# Patient Record
Sex: MALE | Race: WHITE | NOT HISPANIC OR LATINO | URBAN - METROPOLITAN AREA
[De-identification: names, ages, dates, MRNs, and addresses within clinical notes are randomized per-mention and may not be internally consistent; named-entity substitution may affect disease eponyms.]

---

## 2019-05-21 ENCOUNTER — INPATIENT HOSPITAL (OUTPATIENT)
Dept: URBAN - METROPOLITAN AREA HOSPITAL 133 | Facility: HOSPITAL | Age: 64
End: 2019-05-21

## 2019-05-21 DIAGNOSIS — K50.90 CROHN'S DISEASE, UNSPECIFIED, WITHOUT COMPLICATIONS: ICD-10-CM

## 2019-05-21 DIAGNOSIS — R10.9 UNSPECIFIED ABDOMINAL PAIN: ICD-10-CM

## 2019-05-21 DIAGNOSIS — Z94.0 KIDNEY TRANSPLANT STATUS: ICD-10-CM

## 2019-05-21 PROCEDURE — 99232 SBSQ HOSP IP/OBS MODERATE 35: CPT

## 2021-03-05 ENCOUNTER — OFFICE VISIT (OUTPATIENT)
Dept: CARDIOLOGY | Facility: CLINIC | Age: 66
End: 2021-03-05

## 2021-03-05 VITALS
HEART RATE: 61 BPM | WEIGHT: 212 LBS | SYSTOLIC BLOOD PRESSURE: 142 MMHG | HEIGHT: 69 IN | DIASTOLIC BLOOD PRESSURE: 82 MMHG | BODY MASS INDEX: 31.4 KG/M2

## 2021-03-05 DIAGNOSIS — I07.1 MODERATE TRICUSPID INSUFFICIENCY: Chronic | ICD-10-CM

## 2021-03-05 DIAGNOSIS — Z99.89 OBSTRUCTIVE SLEEP APNEA ON CPAP: Chronic | ICD-10-CM

## 2021-03-05 DIAGNOSIS — Z94.0 RENAL TRANSPLANT RECIPIENT: Chronic | ICD-10-CM

## 2021-03-05 DIAGNOSIS — I25.10 CORONARY ARTERY DISEASE INVOLVING NATIVE CORONARY ARTERY OF NATIVE HEART WITHOUT ANGINA PECTORIS: Primary | Chronic | ICD-10-CM

## 2021-03-05 DIAGNOSIS — I10 ESSENTIAL HYPERTENSION: Chronic | ICD-10-CM

## 2021-03-05 DIAGNOSIS — Z95.1 HISTORY OF CORONARY ARTERY BYPASS GRAFT: Chronic | ICD-10-CM

## 2021-03-05 DIAGNOSIS — I35.1 TRACE AORTIC VALVE REGURGITATION: Chronic | ICD-10-CM

## 2021-03-05 DIAGNOSIS — G47.33 OBSTRUCTIVE SLEEP APNEA ON CPAP: Chronic | ICD-10-CM

## 2021-03-05 DIAGNOSIS — N18.4 CKD (CHRONIC KIDNEY DISEASE) STAGE 4, GFR 15-29 ML/MIN (HCC): ICD-10-CM

## 2021-03-05 DIAGNOSIS — Z79.4 TYPE 2 DIABETES MELLITUS WITH OTHER CIRCULATORY COMPLICATION, WITH LONG-TERM CURRENT USE OF INSULIN (HCC): Chronic | ICD-10-CM

## 2021-03-05 DIAGNOSIS — E11.59 TYPE 2 DIABETES MELLITUS WITH OTHER CIRCULATORY COMPLICATION, WITH LONG-TERM CURRENT USE OF INSULIN (HCC): Chronic | ICD-10-CM

## 2021-03-05 DIAGNOSIS — E78.5 HYPERLIPIDEMIA, UNSPECIFIED HYPERLIPIDEMIA TYPE: Chronic | ICD-10-CM

## 2021-03-05 DIAGNOSIS — R06.89 RESPIRATORY INSUFFICIENCY: Chronic | ICD-10-CM

## 2021-03-05 PROCEDURE — 99215 OFFICE O/P EST HI 40 MIN: CPT | Performed by: INTERNAL MEDICINE

## 2021-03-05 RX ORDER — FERROUS SULFATE 220 (44)/5
5 ELIXIR ORAL DAILY
COMMUNITY

## 2021-03-05 RX ORDER — ALLOPURINOL 100 MG/1
100 TABLET ORAL 2 TIMES DAILY
COMMUNITY

## 2021-03-05 RX ORDER — LEVOTHYROXINE SODIUM 0.05 MG/1
50 TABLET ORAL DAILY
COMMUNITY

## 2021-03-05 RX ORDER — AMLODIPINE BESYLATE 2.5 MG/1
2.5 TABLET ORAL 2 TIMES DAILY
Qty: 90 TABLET | Refills: 3 | Status: SHIPPED | COMMUNITY
Start: 2021-03-05 | End: 2022-01-13 | Stop reason: HOSPADM

## 2021-03-05 RX ORDER — CALCITRIOL 0.25 UG/1
0.25 CAPSULE, LIQUID FILLED ORAL SEE ADMIN INSTRUCTIONS
COMMUNITY

## 2021-03-05 RX ORDER — ROSUVASTATIN CALCIUM 20 MG/1
20 TABLET, COATED ORAL DAILY
COMMUNITY

## 2021-03-05 RX ORDER — HYDRALAZINE HYDROCHLORIDE 100 MG/1
100 TABLET, FILM COATED ORAL 3 TIMES DAILY
COMMUNITY
End: 2022-01-13 | Stop reason: HOSPADM

## 2021-03-05 RX ORDER — FUROSEMIDE 80 MG
80 TABLET ORAL 2 TIMES DAILY
COMMUNITY

## 2021-03-05 NOTE — PROGRESS NOTES
"Chief Complaint  Establish Care    Subjective    History of Present Illness      I saw Dilshad Cee today for continued cardiovascular care.  He is a pleasant 65-year-old male who continues to observe the CDC guidelines.  Mr. Cee has stage IV chronic kidney disease.  He is to undergo evaluation for kidney transplantation.  He has known coronary artery disease and is status post coronary artery bypass surgery 12/15/2011 with a LIMA to the LAD, saphenous vein graft to D1 into the right coronary artery.  Procedure was complicated by chylothorax which subsequently resolved.  Dilshad is free of chest pain pressure or tightness.  His primary complaint is that of generalized fatigue and increased dyspnea on exertion.  He sleeps on one pillow free of orthopnea or PND.  He has minimal lower extremity edema.  He does have obstructive sleep apnea and uses CPAP routinely.  He denies syncope near syncope or palpitations.  Review of echocardiogram from 3/14/2017 reveals normal left ventricular contractility, mildly increased left atrium, trace aortic insufficiency, mild tricuspid insufficiency and right ventricular systolic pressure 38 mmHg.  He has a history of hypertension which is elevated in the office today at 142/82.  His last noninvasive ischemic assessment reviewed from 3/14/2017 revealed left ventricular ejection fraction 58% no evidence of ischemia, fixed inferior wall defect suggestive of attenuation.He has a history of hyperlipidemia and is on rosuvastatin 20 mg daily.Fasting lipid profile from 2-21 reveals triglycerides 264, HDL 48 , liver function test within normal limits, BUN 57 and creatinine 4.1, GFR 15.6.    Mr. Yung has type 2 diabetes managed by diet and insulin.  11    Objective   Vital Signs:   /82   Pulse 61   Ht 175.3 cm (69\")   Wt 96.2 kg (212 lb)   BMI 31.31 kg/m²     Constitutional:       Appearance: Well-developed.   Eyes:      Conjunctiva/sclera: Conjunctivae normal.      " Pupils: Pupils are equal, round, and reactive to light.   HENT:      Head: Normocephalic and atraumatic.   Neck:      Musculoskeletal: Neck supple.      Thyroid: No thyromegaly.   Pulmonary:      Effort: Pulmonary effort is normal. No respiratory distress.      Breath sounds: Normal breath sounds. No wheezing. No rales.   Cardiovascular:      Normal rate. Regular rhythm.      Murmurs: There is a grade 2/6 holosystolic murmur at the LRSB.      S4 Gallop. No S3 gallop. Midsystolic click click.   Edema:     Peripheral edema present.     Pretibial: bilateral trace edema of the pretibial area.  Abdominal:      General: Bowel sounds are normal. There is no distension.      Palpations: Abdomen is soft. There is no abdominal mass.      Tenderness: There is no abdominal tenderness.   Musculoskeletal: Normal range of motion.   Skin:     General: Skin is warm and dry.      Findings: No erythema.   Neurological:      Mental Status: Alert and oriented to person, place, and time.         Result Review :   The following data was reviewed by: Evgeny Sunshine MD on 03/05/2021: Lab from 2-21      Data reviewed: Cardiology studies Echocardiogram 3/14/2017, nuclear stress test 3/14/2017          Assessment and Plan    1. Respiratory insufficiency  Progressive  - Stress Test With Myocardial Perfusion; Future  - Adult Transthoracic Echo Complete W/ Color, Spectral and Contrast if Necessary Per Protocol; Future    2. Coronary artery disease involving native coronary artery of native heart without angina pectoris  Free of angina dyspnea on exertion may represent anginal equivalent  - Stress Test With Myocardial Perfusion; Future  - Adult Transthoracic Echo Complete W/ Color, Spectral and Contrast if Necessary Per Protocol; Future    3. History of coronary artery bypass graft      4. Trace aortic valve regurgitation  Compensated    5. Moderate tricuspid insufficiency  Compensated    6. Essential hypertension  Target less than 130/80  -  Stress Test With Myocardial Perfusion; Future  - Adult Transthoracic Echo Complete W/ Color, Spectral and Contrast if Necessary Per Protocol; Future    7. Hyperlipidemia, unspecified hyperlipidemia type  Low-cholesterol low saturated fat diet and continue rosuvastatin 20 mg daily    8. Type 2 diabetes mellitus with other circulatory complication, with long-term current use of insulin (CMS/[facility])  Weight reduction continue diet and insulin control    9. Obstructive sleep apnea on CPAP  Weight reduction continue CPAP    10. Renal transplant recipient  Reevaluation for redo transplant    11. CKD (chronic kidney disease) stage 4, GFR 15-29 ml/min (CMS/[facility])  Progressive    Mr. Chambers reports progressive respiratory insufficiency and demonstrates mild volume excess on physical examination.  I will obtain an echocardiogram to reassess left ventricular size wall thickness contractility and diastolic function as well as valvular function.  This may represent anginal equivalent we will therefore obtain a noninvasive ischemic assessment with stress nuclear testing.  I will advance amlodipine to 5 mg daily for further blood pressure control.  He will monitor his blood pressure at home if it exceeds 130/80 he will contact me.  I will plan to see him in follow-up in 3 months sooner if needed.  I recommended a target triglyceride level of less than 150 and LDL less than 70.      I spent 50 minutes caring for Dilshad on this date of service. This time includes time spent by me in the following activities:preparing for the visit, reviewing tests, obtaining and/or reviewing a separately obtained history, performing a medically appropriate examination and/or evaluation , counseling and educating the patient/family/caregiver, ordering medications, tests, or procedures, referring and communicating with other health care professionals , documenting information in the medical record, independently interpreting results and communicating that  information with the patient/family/caregiver and care coordination  Follow Up   No follow-ups on file.  Patient was given instructions and counseling regarding his condition or for health maintenance advice. Please see specific information pulled into the AVS if appropriate.

## 2021-03-08 ENCOUNTER — TRANSCRIBE ORDERS (OUTPATIENT)
Dept: ADMINISTRATIVE | Facility: HOSPITAL | Age: 66
End: 2021-03-08

## 2021-03-08 DIAGNOSIS — Z01.818 OTHER SPECIFIED PRE-OPERATIVE EXAMINATION: Primary | ICD-10-CM

## 2021-03-19 ENCOUNTER — BULK ORDERING (OUTPATIENT)
Dept: CASE MANAGEMENT | Facility: OTHER | Age: 66
End: 2021-03-19

## 2021-03-19 DIAGNOSIS — Z23 IMMUNIZATION DUE: ICD-10-CM

## 2021-03-25 ENCOUNTER — HOSPITAL ENCOUNTER (OUTPATIENT)
Dept: CARDIOLOGY | Facility: HOSPITAL | Age: 66
Discharge: HOME | End: 2021-03-25
Admitting: INTERNAL MEDICINE

## 2021-03-25 VITALS
WEIGHT: 212 LBS | BODY MASS INDEX: 31.4 KG/M2 | DIASTOLIC BLOOD PRESSURE: 69 MMHG | HEIGHT: 69 IN | HEART RATE: 60 BPM | SYSTOLIC BLOOD PRESSURE: 123 MMHG

## 2021-03-25 DIAGNOSIS — I10 ESSENTIAL HYPERTENSION: ICD-10-CM

## 2021-03-25 DIAGNOSIS — I25.10 CORONARY ARTERY DISEASE INVOLVING NATIVE CORONARY ARTERY OF NATIVE HEART WITHOUT ANGINA PECTORIS: ICD-10-CM

## 2021-03-25 DIAGNOSIS — R06.89 RESPIRATORY INSUFFICIENCY: ICD-10-CM

## 2021-03-25 LAB
AORTIC DIMENSIONLESS INDEX: 0.7 (DI)
BH CV ECHO MEAS - AO MAX PG (FULL): 7.1 MMHG
BH CV ECHO MEAS - AO MAX PG: 12.5 MMHG
BH CV ECHO MEAS - AO MEAN PG (FULL): 4 MMHG
BH CV ECHO MEAS - AO MEAN PG: 7 MMHG
BH CV ECHO MEAS - AO V2 MAX: 177 CM/SEC
BH CV ECHO MEAS - AO V2 MEAN: 122 CM/SEC
BH CV ECHO MEAS - AO V2 VTI: 35.4 CM
BH CV ECHO MEAS - AVA(I,A): 2.6 CM^2
BH CV ECHO MEAS - AVA(I,D): 2.6 CM^2
BH CV ECHO MEAS - AVA(V,A): 2.5 CM^2
BH CV ECHO MEAS - AVA(V,D): 2.5 CM^2
BH CV ECHO MEAS - BSA(HAYCOCK): 2.2 M^2
BH CV ECHO MEAS - BSA: 2.1 M^2
BH CV ECHO MEAS - BZI_BMI: 31.3 KILOGRAMS/M^2
BH CV ECHO MEAS - BZI_METRIC_HEIGHT: 175.3 CM
BH CV ECHO MEAS - BZI_METRIC_WEIGHT: 96.2 KG
BH CV ECHO MEAS - CONTRAST EF 4CH: 54 CM2
BH CV ECHO MEAS - EDV(CUBED): 175.6 ML
BH CV ECHO MEAS - EDV(MOD-SP2): 127 ML
BH CV ECHO MEAS - EDV(MOD-SP4): 137 ML
BH CV ECHO MEAS - EDV(TEICH): 153.7 ML
BH CV ECHO MEAS - EF(CUBED): 75.6 %
BH CV ECHO MEAS - EF(MOD-BP): 54 %
BH CV ECHO MEAS - EF(MOD-SP2): 53.5 %
BH CV ECHO MEAS - EF(MOD-SP4): 55.5 %
BH CV ECHO MEAS - EF(TEICH): 66.9 %
BH CV ECHO MEAS - ESV(CUBED): 42.9 ML
BH CV ECHO MEAS - ESV(MOD-SP2): 59 ML
BH CV ECHO MEAS - ESV(MOD-SP4): 61 ML
BH CV ECHO MEAS - ESV(TEICH): 50.9 ML
BH CV ECHO MEAS - FS: 37.5 %
BH CV ECHO MEAS - IVS/LVPW: 1.1
BH CV ECHO MEAS - IVSD: 1.5 CM
BH CV ECHO MEAS - LAT PEAK E' VEL: 9.6 CM/SEC
BH CV ECHO MEAS - LV DIASTOLIC VOL/BSA (35-75): 64.7 ML/M^2
BH CV ECHO MEAS - LV MASS(C)D: 365.4 GRAMS
BH CV ECHO MEAS - LV MASS(C)DI: 172.6 GRAMS/M^2
BH CV ECHO MEAS - LV MAX PG: 5.5 MMHG
BH CV ECHO MEAS - LV MEAN PG: 3 MMHG
BH CV ECHO MEAS - LV SYSTOLIC VOL/BSA (12-30): 28.8 ML/M^2
BH CV ECHO MEAS - LV V1 MAX: 117 CM/SEC
BH CV ECHO MEAS - LV V1 MEAN: 80.4 CM/SEC
BH CV ECHO MEAS - LV V1 VTI: 24.6 CM
BH CV ECHO MEAS - LVIDD: 5.6 CM
BH CV ECHO MEAS - LVIDS: 3.5 CM
BH CV ECHO MEAS - LVLD AP2: 8.3 CM
BH CV ECHO MEAS - LVLD AP4: 7.9 CM
BH CV ECHO MEAS - LVLS AP2: 6.2 CM
BH CV ECHO MEAS - LVLS AP4: 6.7 CM
BH CV ECHO MEAS - LVOT AREA (M): 3.8 CM^2
BH CV ECHO MEAS - LVOT AREA: 3.8 CM^2
BH CV ECHO MEAS - LVOT DIAM: 2.2 CM
BH CV ECHO MEAS - LVPWD: 1.4 CM
BH CV ECHO MEAS - MED PEAK E' VEL: 7.6 CM/SEC
BH CV ECHO MEAS - MV A DUR: 0.09 SEC
BH CV ECHO MEAS - MV A MAX VEL: 96.7 CM/SEC
BH CV ECHO MEAS - MV DEC SLOPE: 301 CM/SEC^2
BH CV ECHO MEAS - MV DEC TIME: 230 SEC
BH CV ECHO MEAS - MV E MAX VEL: 118 CM/SEC
BH CV ECHO MEAS - MV E/A: 1.2
BH CV ECHO MEAS - MV MAX PG: 6.6 MMHG
BH CV ECHO MEAS - MV MEAN PG: 3 MMHG
BH CV ECHO MEAS - MV P1/2T MAX VEL: 127 CM/SEC
BH CV ECHO MEAS - MV P1/2T: 123.6 MSEC
BH CV ECHO MEAS - MV V2 MAX: 128 CM/SEC
BH CV ECHO MEAS - MV V2 MEAN: 75.2 CM/SEC
BH CV ECHO MEAS - MV V2 VTI: 46.1 CM
BH CV ECHO MEAS - MVA P1/2T LCG: 1.7 CM^2
BH CV ECHO MEAS - MVA(P1/2T): 1.8 CM^2
BH CV ECHO MEAS - MVA(VTI): 2 CM^2
BH CV ECHO MEAS - PA ACC TIME: 0.09 SEC
BH CV ECHO MEAS - PA MAX PG (FULL): 3.9 MMHG
BH CV ECHO MEAS - PA MAX PG: 5.1 MMHG
BH CV ECHO MEAS - PA PR(ACCEL): 38.5 MMHG
BH CV ECHO MEAS - PA V2 MAX: 113 CM/SEC
BH CV ECHO MEAS - PULM A REVS DUR: 0.16 SEC
BH CV ECHO MEAS - PULM A REVS VEL: 29.4 CM/SEC
BH CV ECHO MEAS - PULM DIAS VEL: 41.8 CM/SEC
BH CV ECHO MEAS - PULM S/D: 1.5
BH CV ECHO MEAS - PULM SYS VEL: 61.1 CM/SEC
BH CV ECHO MEAS - PVA(V,A): 1.7 CM^2
BH CV ECHO MEAS - PVA(V,D): 1.7 CM^2
BH CV ECHO MEAS - QP/QS: 0.41
BH CV ECHO MEAS - RAP SYSTOLE: 3 MMHG
BH CV ECHO MEAS - RV MAX PG: 1.2 MMHG
BH CV ECHO MEAS - RV MEAN PG: 1 MMHG
BH CV ECHO MEAS - RV V1 MAX: 54.8 CM/SEC
BH CV ECHO MEAS - RV V1 MEAN: 37.7 CM/SEC
BH CV ECHO MEAS - RV V1 VTI: 11.1 CM
BH CV ECHO MEAS - RVOT AREA: 3.5 CM^2
BH CV ECHO MEAS - RVOT DIAM: 2.1 CM
BH CV ECHO MEAS - RVSP: 34.1 MMHG
BH CV ECHO MEAS - SI(CUBED): 62.7 ML/M^2
BH CV ECHO MEAS - SI(LVOT): 44.2 ML/M^2
BH CV ECHO MEAS - SI(MOD-SP2): 32.1 ML/M^2
BH CV ECHO MEAS - SI(MOD-SP4): 35.9 ML/M^2
BH CV ECHO MEAS - SI(TEICH): 48.5 ML/M^2
BH CV ECHO MEAS - SV(CUBED): 132.7 ML
BH CV ECHO MEAS - SV(LVOT): 93.5 ML
BH CV ECHO MEAS - SV(MOD-SP2): 68 ML
BH CV ECHO MEAS - SV(MOD-SP4): 76 ML
BH CV ECHO MEAS - SV(RVOT): 38.4 ML
BH CV ECHO MEAS - SV(TEICH): 102.8 ML
BH CV ECHO MEAS - TAPSE (>1.6): 2.1 CM
BH CV ECHO MEAS - TR MAX VEL: 279 CM/SEC
BH CV ECHO MEASUREMENTS AVERAGE E/E' RATIO: 13.72
BH CV VAS BP RIGHT ARM: NORMAL MMHG
BH CV XLRA - RV BASE: 3.8 CM
BH CV XLRA - RV LENGTH: 8 CM
BH CV XLRA - RV MID: 2.8 CM
BH CV XLRA - TDI S': 11.7 CM/SEC
LEFT ATRIUM VOLUME INDEX: 41 ML/M2
MAXIMAL PREDICTED HEART RATE: 154 BPM
STRESS TARGET HR: 131 BPM

## 2021-03-25 PROCEDURE — 93306 TTE W/DOPPLER COMPLETE: CPT

## 2021-03-25 PROCEDURE — 25010000002 PERFLUTREN (DEFINITY) 8.476 MG IN SODIUM CHLORIDE (PF) 0.9 % 10 ML INJECTION: Performed by: INTERNAL MEDICINE

## 2021-03-25 PROCEDURE — 93306 TTE W/DOPPLER COMPLETE: CPT | Performed by: INTERNAL MEDICINE

## 2021-03-25 RX ADMIN — SODIUM CHLORIDE 2 ML: 9 INJECTION INTRAMUSCULAR; INTRAVENOUS; SUBCUTANEOUS at 14:38

## 2021-04-07 ENCOUNTER — LAB (OUTPATIENT)
Dept: LAB | Facility: HOSPITAL | Age: 66
End: 2021-04-07

## 2021-04-07 DIAGNOSIS — Z01.818 OTHER SPECIFIED PRE-OPERATIVE EXAMINATION: ICD-10-CM

## 2021-04-07 PROCEDURE — U0005 INFEC AGEN DETEC AMPLI PROBE: HCPCS

## 2021-04-07 PROCEDURE — C9803 HOPD COVID-19 SPEC COLLECT: HCPCS

## 2021-04-07 PROCEDURE — U0004 COV-19 TEST NON-CDC HGH THRU: HCPCS

## 2021-04-08 LAB — SARS-COV-2 RNA RESP QL NAA+PROBE: NOT DETECTED

## 2021-04-09 ENCOUNTER — HOSPITAL ENCOUNTER (OUTPATIENT)
Dept: NUCLEAR MEDICINE | Facility: HOSPITAL | Age: 66
Discharge: HOME | End: 2021-04-09

## 2021-04-09 DIAGNOSIS — I10 ESSENTIAL HYPERTENSION: ICD-10-CM

## 2021-04-09 DIAGNOSIS — I25.10 CORONARY ARTERY DISEASE INVOLVING NATIVE CORONARY ARTERY OF NATIVE HEART WITHOUT ANGINA PECTORIS: ICD-10-CM

## 2021-04-09 DIAGNOSIS — R06.89 RESPIRATORY INSUFFICIENCY: ICD-10-CM

## 2021-04-09 LAB
BH CV REST NUCLEAR ISOTOPE DOSE: 10.8 MCI
BH CV STRESS BP STAGE 1: NORMAL
BH CV STRESS BP STAGE 2: NORMAL
BH CV STRESS DURATION MIN STAGE 1: 3
BH CV STRESS DURATION MIN STAGE 2: 2
BH CV STRESS DURATION SEC STAGE 1: 0
BH CV STRESS DURATION SEC STAGE 2: 50
BH CV STRESS GRADE STAGE 1: 10
BH CV STRESS GRADE STAGE 2: 12
BH CV STRESS HR STAGE 1: 112
BH CV STRESS HR STAGE 2: 103
BH CV STRESS METS STAGE 1: 5
BH CV STRESS METS STAGE 2: 7.5
BH CV STRESS NUCLEAR ISOTOPE DOSE: 32 MCI
BH CV STRESS PROTOCOL 1: NORMAL
BH CV STRESS PROTOCOL 2 COMMENTS STAGE 1: NORMAL
BH CV STRESS PROTOCOL 2 DOSE REGADENOSON STAGE 1: 0.4
BH CV STRESS PROTOCOL 2 DURATION MIN STAGE 1: 0
BH CV STRESS PROTOCOL 2 DURATION SEC STAGE 1: 10
BH CV STRESS PROTOCOL 2 STAGE 1: 1
BH CV STRESS PROTOCOL 2: NORMAL
BH CV STRESS RECOVERY BP: NORMAL MMHG
BH CV STRESS RECOVERY HR: 81 BPM
BH CV STRESS SPEED STAGE 1: 1.7
BH CV STRESS SPEED STAGE 2: 2.5
BH CV STRESS STAGE 1: 1
BH CV STRESS STAGE 2: 2
MAXIMAL PREDICTED HEART RATE: 154 BPM
PERCENT MAX PREDICTED HR: 76.62 %
STRESS BASELINE BP: NORMAL MMHG
STRESS BASELINE HR: 68 BPM
STRESS PERCENT HR: 90 %
STRESS POST ESTIMATED WORKLOAD: 4.6 METS
STRESS POST EXERCISE DUR MIN: 5 MIN
STRESS POST EXERCISE DUR SEC: 50 SEC
STRESS POST PEAK BP: NORMAL MMHG
STRESS POST PEAK HR: 118 BPM
STRESS TARGET HR: 131 BPM

## 2021-04-09 PROCEDURE — 78452 HT MUSCLE IMAGE SPECT MULT: CPT

## 2021-04-09 PROCEDURE — 0 TECHNETIUM SESTAMIBI: Performed by: INTERNAL MEDICINE

## 2021-04-09 PROCEDURE — A9500 TC99M SESTAMIBI: HCPCS | Performed by: INTERNAL MEDICINE

## 2021-04-09 PROCEDURE — 93017 CV STRESS TEST TRACING ONLY: CPT

## 2021-04-09 PROCEDURE — 25010000002 REGADENOSON 0.4 MG/5ML SOLUTION: Performed by: INTERNAL MEDICINE

## 2021-04-09 PROCEDURE — 78452 HT MUSCLE IMAGE SPECT MULT: CPT | Performed by: INTERNAL MEDICINE

## 2021-04-09 PROCEDURE — 93018 CV STRESS TEST I&R ONLY: CPT | Performed by: INTERNAL MEDICINE

## 2021-04-09 RX ADMIN — TECHNETIUM TC 99M SESTAMIBI 1 DOSE: 1 INJECTION INTRAVENOUS at 07:00

## 2021-04-09 RX ADMIN — REGADENOSON 0.4 MG: 0.08 INJECTION, SOLUTION INTRAVENOUS at 08:30

## 2021-04-09 RX ADMIN — TECHNETIUM TC 99M SESTAMIBI 1 DOSE: 1 INJECTION INTRAVENOUS at 08:30

## 2021-04-12 ENCOUNTER — TELEPHONE (OUTPATIENT)
Dept: CARDIOLOGY | Facility: CLINIC | Age: 66
End: 2021-04-12

## 2021-04-12 DIAGNOSIS — R06.89 RESPIRATORY INSUFFICIENCY: ICD-10-CM

## 2021-04-12 DIAGNOSIS — I25.10 CORONARY ARTERY DISEASE INVOLVING NATIVE CORONARY ARTERY OF NATIVE HEART WITHOUT ANGINA PECTORIS: ICD-10-CM

## 2021-04-12 DIAGNOSIS — Z01.818 PRE-OP TESTING: Primary | ICD-10-CM

## 2021-04-12 DIAGNOSIS — R94.39 POSITIVE CARDIAC STRESS TEST: ICD-10-CM

## 2021-04-12 NOTE — TELEPHONE ENCOUNTER
CNA PT    PT needs cardiac CL for Fistula creation with DR Kimbrough ([facility]) on Friday 4/16/21.    General Anesthesia  [phone] ext [phone] [place]

## 2021-04-12 NOTE — TELEPHONE ENCOUNTER
I spoke with pt this morning about his stress results and he is agreeable to a cath.     He wanted to have the fistula placed first just in case he has stents placed because he won't be able to hold Antiplatelet for fistula anytime soon if he does have stents.    Thanks,  Nathalie

## 2021-04-13 PROBLEM — I25.10 CORONARY ARTERY DISEASE INVOLVING NATIVE CORONARY ARTERY OF NATIVE HEART WITHOUT ANGINA PECTORIS: Status: ACTIVE | Noted: 2021-04-13

## 2021-04-13 PROBLEM — R94.39 POSITIVE CARDIAC STRESS TEST: Status: ACTIVE | Noted: 2021-04-13

## 2021-04-13 NOTE — TELEPHONE ENCOUNTER
Spoke with Marilin and per Dr. Dominguez and anesthesia, pt's surgery will need to be delayed because of the abnormal testing until after he has a cath. If a stent is placed, Dr. Dominguez can proceed with the fistula while pt is on Plavix. I will communicate with Marilin once cath is complete. Nathalie

## 2021-04-15 ENCOUNTER — TRANSCRIBE ORDERS (OUTPATIENT)
Dept: LAB | Facility: HOSPITAL | Age: 66
End: 2021-04-15

## 2021-04-15 DIAGNOSIS — Z01.818 OTHER SPECIFIED PRE-OPERATIVE EXAMINATION: Primary | ICD-10-CM

## 2021-04-20 ENCOUNTER — LAB (OUTPATIENT)
Dept: LAB | Facility: HOSPITAL | Age: 66
End: 2021-04-20

## 2021-04-20 DIAGNOSIS — I25.10 CORONARY ARTERY DISEASE INVOLVING NATIVE CORONARY ARTERY OF NATIVE HEART WITHOUT ANGINA PECTORIS: ICD-10-CM

## 2021-04-20 DIAGNOSIS — Z01.818 PRE-OP TESTING: ICD-10-CM

## 2021-04-20 DIAGNOSIS — R94.39 POSITIVE CARDIAC STRESS TEST: ICD-10-CM

## 2021-04-20 LAB
ALBUMIN SERPL-MCNC: 3.8 G/DL (ref 3.5–5.2)
ALBUMIN/GLOB SERPL: 1.1 G/DL
ALP SERPL-CCNC: 102 U/L (ref 39–117)
ALT SERPL W P-5'-P-CCNC: 20 U/L (ref 1–41)
ANION GAP SERPL CALCULATED.3IONS-SCNC: 14.5 MMOL/L (ref 5–15)
AST SERPL-CCNC: 19 U/L (ref 1–40)
BASOPHILS # BLD AUTO: 0.03 10*3/MM3 (ref 0–0.2)
BASOPHILS NFR BLD AUTO: 0.5 % (ref 0–1.5)
BILIRUB SERPL-MCNC: 0.3 MG/DL (ref 0–1.2)
BUN SERPL-MCNC: 72 MG/DL (ref 8–23)
BUN/CREAT SERPL: 16.9 (ref 7–25)
CALCIUM SPEC-SCNC: 9.5 MG/DL (ref 8.6–10.5)
CHLORIDE SERPL-SCNC: 98 MMOL/L (ref 98–107)
CO2 SERPL-SCNC: 25.5 MMOL/L (ref 22–29)
CREAT SERPL-MCNC: 4.26 MG/DL (ref 0.76–1.27)
DEPRECATED RDW RBC AUTO: 42.4 FL (ref 37–54)
EOSINOPHIL # BLD AUTO: 0.1 10*3/MM3 (ref 0–0.4)
EOSINOPHIL NFR BLD AUTO: 1.5 % (ref 0.3–6.2)
ERYTHROCYTE [DISTWIDTH] IN BLOOD BY AUTOMATED COUNT: 13 % (ref 12.3–15.4)
GFR SERPL CREATININE-BSD FRML MDRD: 14 ML/MIN/1.73
GFR SERPL CREATININE-BSD FRML MDRD: ABNORMAL ML/MIN/{1.73_M2}
GLOBULIN UR ELPH-MCNC: 3.5 GM/DL
GLUCOSE SERPL-MCNC: 194 MG/DL (ref 65–99)
HCT VFR BLD AUTO: 29.7 % (ref 37.5–51)
HGB BLD-MCNC: 9.7 G/DL (ref 13–17.7)
IMM GRANULOCYTES # BLD AUTO: 0.04 10*3/MM3 (ref 0–0.05)
IMM GRANULOCYTES NFR BLD AUTO: 0.6 % (ref 0–0.5)
INR PPP: 1.07 (ref 0.9–1.1)
LYMPHOCYTES # BLD AUTO: 0.37 10*3/MM3 (ref 0.7–3.1)
LYMPHOCYTES NFR BLD AUTO: 5.6 % (ref 19.6–45.3)
MCH RBC QN AUTO: 29.2 PG (ref 26.6–33)
MCHC RBC AUTO-ENTMCNC: 32.7 G/DL (ref 31.5–35.7)
MCV RBC AUTO: 89.5 FL (ref 79–97)
MONOCYTES # BLD AUTO: 1.03 10*3/MM3 (ref 0.1–0.9)
MONOCYTES NFR BLD AUTO: 15.6 % (ref 5–12)
NEUTROPHILS NFR BLD AUTO: 5.03 10*3/MM3 (ref 1.7–7)
NEUTROPHILS NFR BLD AUTO: 76.2 % (ref 42.7–76)
NRBC BLD AUTO-RTO: 0 /100 WBC (ref 0–0.2)
PLATELET # BLD AUTO: 251 10*3/MM3 (ref 140–450)
PMV BLD AUTO: 9.7 FL (ref 6–12)
POTASSIUM SERPL-SCNC: 3.8 MMOL/L (ref 3.5–5.2)
PROT SERPL-MCNC: 7.3 G/DL (ref 6–8.5)
PROTHROMBIN TIME: 13.7 SECONDS (ref 11.7–14.2)
RBC # BLD AUTO: 3.32 10*6/MM3 (ref 4.14–5.8)
SODIUM SERPL-SCNC: 138 MMOL/L (ref 136–145)
WBC # BLD AUTO: 6.6 10*3/MM3 (ref 3.4–10.8)

## 2021-04-20 PROCEDURE — 85025 COMPLETE CBC W/AUTO DIFF WBC: CPT

## 2021-04-20 PROCEDURE — 36415 COLL VENOUS BLD VENIPUNCTURE: CPT

## 2021-04-20 PROCEDURE — 80053 COMPREHEN METABOLIC PANEL: CPT

## 2021-04-20 PROCEDURE — 85610 PROTHROMBIN TIME: CPT

## 2021-04-24 ENCOUNTER — LAB (OUTPATIENT)
Dept: LAB | Facility: HOSPITAL | Age: 66
End: 2021-04-24

## 2021-04-24 DIAGNOSIS — Z01.818 OTHER SPECIFIED PRE-OPERATIVE EXAMINATION: ICD-10-CM

## 2021-04-24 PROCEDURE — U0004 COV-19 TEST NON-CDC HGH THRU: HCPCS

## 2021-04-24 PROCEDURE — C9803 HOPD COVID-19 SPEC COLLECT: HCPCS

## 2021-04-24 PROCEDURE — U0005 INFEC AGEN DETEC AMPLI PROBE: HCPCS

## 2021-04-26 LAB — SARS-COV-2 RNA RESP QL NAA+PROBE: NOT DETECTED

## 2021-04-27 ENCOUNTER — HOSPITAL ENCOUNTER (OUTPATIENT)
Facility: HOSPITAL | Age: 66
Setting detail: HOSPITAL OUTPATIENT SURGERY
Discharge: HOME | End: 2021-04-27
Attending: INTERNAL MEDICINE | Admitting: INTERNAL MEDICINE

## 2021-04-27 VITALS
TEMPERATURE: 97.6 F | OXYGEN SATURATION: 97 % | SYSTOLIC BLOOD PRESSURE: 139 MMHG | BODY MASS INDEX: 31.1 KG/M2 | DIASTOLIC BLOOD PRESSURE: 89 MMHG | HEIGHT: 69 IN | RESPIRATION RATE: 16 BRPM | HEART RATE: 59 BPM | WEIGHT: 210 LBS

## 2021-04-27 DIAGNOSIS — R06.89 RESPIRATORY INSUFFICIENCY: ICD-10-CM

## 2021-04-27 DIAGNOSIS — I25.10 CORONARY ARTERY DISEASE INVOLVING NATIVE CORONARY ARTERY OF NATIVE HEART WITHOUT ANGINA PECTORIS: ICD-10-CM

## 2021-04-27 DIAGNOSIS — R94.39 POSITIVE CARDIAC STRESS TEST: ICD-10-CM

## 2021-04-27 LAB
GLUCOSE BLDC GLUCOMTR-MCNC: 116 MG/DL (ref 70–130)
GLUCOSE BLDC GLUCOMTR-MCNC: 143 MG/DL (ref 70–130)

## 2021-04-27 PROCEDURE — C1894 INTRO/SHEATH, NON-LASER: HCPCS | Performed by: INTERNAL MEDICINE

## 2021-04-27 PROCEDURE — C1769 GUIDE WIRE: HCPCS | Performed by: INTERNAL MEDICINE

## 2021-04-27 PROCEDURE — C1887 CATHETER, GUIDING: HCPCS | Performed by: INTERNAL MEDICINE

## 2021-04-27 PROCEDURE — 25010000002 FENTANYL CITRATE (PF) 100 MCG/2ML SOLUTION: Performed by: INTERNAL MEDICINE

## 2021-04-27 PROCEDURE — 99153 MOD SED SAME PHYS/QHP EA: CPT | Performed by: INTERNAL MEDICINE

## 2021-04-27 PROCEDURE — 82962 GLUCOSE BLOOD TEST: CPT

## 2021-04-27 PROCEDURE — 93459 L HRT ART/GRFT ANGIO: CPT | Performed by: INTERNAL MEDICINE

## 2021-04-27 PROCEDURE — C1760 CLOSURE DEV, VASC: HCPCS | Performed by: INTERNAL MEDICINE

## 2021-04-27 PROCEDURE — 85347 COAGULATION TIME ACTIVATED: CPT

## 2021-04-27 PROCEDURE — 25010000002 HEPARIN (PORCINE) PER 1000 UNITS: Performed by: INTERNAL MEDICINE

## 2021-04-27 PROCEDURE — 0 IOPAMIDOL PER 1 ML: Performed by: INTERNAL MEDICINE

## 2021-04-27 PROCEDURE — 25010000002 MIDAZOLAM PER 1 MG: Performed by: INTERNAL MEDICINE

## 2021-04-27 PROCEDURE — 99152 MOD SED SAME PHYS/QHP 5/>YRS: CPT | Performed by: INTERNAL MEDICINE

## 2021-04-27 RX ORDER — SODIUM CHLORIDE 0.9 % (FLUSH) 0.9 %
10 SYRINGE (ML) INJECTION AS NEEDED
Status: DISCONTINUED | OUTPATIENT
Start: 2021-04-27 | End: 2021-04-27 | Stop reason: HOSPADM

## 2021-04-27 RX ORDER — HEPARIN SODIUM 1000 [USP'U]/ML
INJECTION, SOLUTION INTRAVENOUS; SUBCUTANEOUS AS NEEDED
Status: DISCONTINUED | OUTPATIENT
Start: 2021-04-27 | End: 2021-04-27 | Stop reason: HOSPADM

## 2021-04-27 RX ORDER — SODIUM CHLORIDE 0.9 % (FLUSH) 0.9 %
3 SYRINGE (ML) INJECTION EVERY 12 HOURS SCHEDULED
Status: DISCONTINUED | OUTPATIENT
Start: 2021-04-27 | End: 2021-04-27 | Stop reason: HOSPADM

## 2021-04-27 RX ORDER — FENTANYL CITRATE 50 UG/ML
INJECTION, SOLUTION INTRAMUSCULAR; INTRAVENOUS AS NEEDED
Status: DISCONTINUED | OUTPATIENT
Start: 2021-04-27 | End: 2021-04-27 | Stop reason: HOSPADM

## 2021-04-27 RX ORDER — MIDAZOLAM HYDROCHLORIDE 1 MG/ML
INJECTION INTRAMUSCULAR; INTRAVENOUS AS NEEDED
Status: DISCONTINUED | OUTPATIENT
Start: 2021-04-27 | End: 2021-04-27 | Stop reason: HOSPADM

## 2021-04-27 RX ORDER — SODIUM CHLORIDE 9 MG/ML
75 INJECTION, SOLUTION INTRAVENOUS CONTINUOUS
Status: DISCONTINUED | OUTPATIENT
Start: 2021-04-27 | End: 2021-04-27 | Stop reason: HOSPADM

## 2021-04-27 RX ORDER — SODIUM CHLORIDE 9 MG/ML
100 INJECTION, SOLUTION INTRAVENOUS CONTINUOUS
Status: DISCONTINUED | OUTPATIENT
Start: 2021-04-27 | End: 2021-04-27 | Stop reason: HOSPADM

## 2021-04-27 RX ORDER — LIDOCAINE HYDROCHLORIDE 20 MG/ML
INJECTION, SOLUTION INFILTRATION; PERINEURAL AS NEEDED
Status: DISCONTINUED | OUTPATIENT
Start: 2021-04-27 | End: 2021-04-27 | Stop reason: HOSPADM

## 2021-04-27 RX ORDER — ACETAMINOPHEN 325 MG/1
650 TABLET ORAL EVERY 4 HOURS PRN
Status: DISCONTINUED | OUTPATIENT
Start: 2021-04-27 | End: 2021-04-27 | Stop reason: HOSPADM

## 2021-04-27 RX ADMIN — SODIUM CHLORIDE 75 ML/HR: 9 INJECTION, SOLUTION INTRAVENOUS at 10:10

## 2021-04-27 NOTE — H&P
"Chief Complaint  Establish Care        Subjective       History of Present Illness       I saw Dilshad Cee today for continued cardiovascular care.  He is a pleasant 65-year-old male who continues to observe the CDC guidelines.  Mr. Cee has stage IV chronic kidney disease.  He is to undergo evaluation for kidney transplantation.  He has known coronary artery disease and is status post coronary artery bypass surgery 12/15/2011 with a LIMA to the LAD, saphenous vein graft to D1 into the right coronary artery.  Procedure was complicated by chylothorax which subsequently resolved.  Dilshad is free of chest pain pressure or tightness.  His primary complaint is that of generalized fatigue and increased dyspnea on exertion.  He sleeps on one pillow free of orthopnea or PND.  He has minimal lower extremity edema.  He does have obstructive sleep apnea and uses CPAP routinely.  He denies syncope near syncope or palpitations.  Review of echocardiogram from 3/14/2017 reveals normal left ventricular contractility, mildly increased left atrium, trace aortic insufficiency, mild tricuspid insufficiency and right ventricular systolic pressure 38 mmHg.  He has a history of hypertension which is elevated in the office today at 142/82.  His last noninvasive ischemic assessment reviewed from 3/14/2017 revealed left ventricular ejection fraction 58% no evidence of ischemia, fixed inferior wall defect suggestive of attenuation.He has a history of hyperlipidemia and is on rosuvastatin 20 mg daily.Fasting lipid profile from 2-21 reveals triglycerides 264, HDL 48 , liver function test within normal limits, BUN 57 and creatinine 4.1, GFR 15.6.     Mr. Yung has type 2 diabetes managed by diet and insulin.  11           Objective      Vital Signs:   /82   Pulse 61   Ht 175.3 cm (69\")   Wt 96.2 kg (212 lb)   BMI 31.31 kg/m²     Constitutional:       Appearance: Well-developed.   Eyes:      Conjunctiva/sclera: Conjunctivae " normal.      Pupils: Pupils are equal, round, and reactive to light.   HENT:      Head: Normocephalic and atraumatic.   Neck:      Musculoskeletal: Neck supple.      Thyroid: No thyromegaly.   Pulmonary:      Effort: Pulmonary effort is normal. No respiratory distress.      Breath sounds: Normal breath sounds. No wheezing. No rales.   Cardiovascular:      Normal rate. Regular rhythm.      Murmurs: There is a grade 2/6 holosystolic murmur at the LRSB.      S4 Gallop. No S3 gallop. Midsystolic click click.   Edema:     Peripheral edema present.     Pretibial: bilateral trace edema of the pretibial area.  Abdominal:      General: Bowel sounds are normal. There is no distension.      Palpations: Abdomen is soft. There is no abdominal mass.      Tenderness: There is no abdominal tenderness.   Musculoskeletal: Normal range of motion.   Skin:     General: Skin is warm and dry.      Findings: No erythema.   Neurological:      Mental Status: Alert and oriented to person, place, and time.              Result Review    :   The following data was reviewed by: Evgeny Sunshine MD on 03/05/2021: Lab from 2-21    Common Labs:          Data reviewed: Cardiology studies Echocardiogram 3/14/2017, nuclear stress test 3/14/2017             Assessment      Assessment and Plan    1. Respiratory insufficiency  Progressive  - Stress Test With Myocardial Perfusion; Future  - Adult Transthoracic Echo Complete W/ Color, Spectral and Contrast if Necessary Per Protocol; Future     2. Coronary artery disease involving native coronary artery of native heart without angina pectoris  Free of angina dyspnea on exertion may represent anginal equivalent  - Stress Test With Myocardial Perfusion; Future  - Adult Transthoracic Echo Complete W/ Color, Spectral and Contrast if Necessary Per Protocol; Future     3. History of coronary artery bypass graft        4. Trace aortic valve regurgitation  Compensated     5. Moderate tricuspid  insufficiency  Compensated     6. Essential hypertension  Target less than 130/80  - Stress Test With Myocardial Perfusion; Future  - Adult Transthoracic Echo Complete W/ Color, Spectral and Contrast if Necessary Per Protocol; Future     7. Hyperlipidemia, unspecified hyperlipidemia type  Low-cholesterol low saturated fat diet and continue rosuvastatin 20 mg daily     8. Type 2 diabetes mellitus with other circulatory complication, with long-term current use of insulin (CMS/HCC)  Weight reduction continue diet and insulin control     9. Obstructive sleep apnea on CPAP  Weight reduction continue CPAP     10. Renal transplant recipient  Reevaluation for redo transplant     11. CKD (chronic kidney disease) stage 4, GFR 15-29 ml/min (CMS/HCC)  Progressive     Mr. Chambers reports progressive respiratory insufficiency and demonstrates mild volume excess on physical examination.  I will obtain an echocardiogram to reassess left ventricular size wall thickness contractility and diastolic function as well as valvular function.  This may represent anginal equivalent we will therefore obtain a noninvasive ischemic assessment with stress nuclear testing.  I will advance amlodipine to 5 mg daily for further blood pressure control.  He will monitor his blood pressure at home if it exceeds 130/80 he will contact me.  I will plan to see him in follow-up in 3 months sooner if needed.  I recommended a target triglyceride level of less than 150 and LDL less than 70.      Interim update:  Stress test showed large defect in the anterolateral inferolateral and inferior walls with mild improvement inferior wall and lateral walls.  With stress-induced left ventricular dilatation.  Therefore the plan is to perform cardiac catheterization to evaluate for culprits to the above, concern for three-vessel disease.

## 2021-04-27 NOTE — DISCHARGE INSTRUCTIONS
[facility]  [address] [place] [address]      Coronary Angiogram (Femoral Approach) After Care     Refer to this sheet in the next few weeks. These instructions provide you with information on caring for yourself after your procedure. Your health care provider may also give you more specific instructions. Your treatment has been planned according to current medical practices, but problems sometimes occur. Call your health care provider if you have any problems or questions after your procedure.      What to Expect After the Procedure:  After your procedure, it is typical to have the following sensations:  · Minor discomfort or tenderness and a small bump at the catheter insertion site. The bump should usually decrease in size and tenderness within 1 to 2 weeks.  · Any bruising will usually fade within 2 to 4 weeks.  Home Care Instructions:  · Do not apply powder or lotion to the site.  · Do not take baths, swim, or use a hot tub until your health care provider approves and the site is completely healed.  · Do not bend, squat, or lift anything over 20 lb (9 kg) or as directed by your health care provider. However, we recommend lifting nothing heavier than a gallon of milk.    · You may shower 24 hours after the procedure. Remove the bandage (dressing) and gently wash the site with plain soap and water. Gently pat the site dry. You may apply a band aid daily for 2 days if desired.    · Inspect the site at least twice daily.  · Increase your fluid intake for the next 2 days.    · Limit your activity for the first 48 hours. .    · Avoid strenuous activity for 1 week or as advised by your physician.    · Follow instructions about when you can drive or return to work as directed by your physician.    · Hold direct pressure over the site when you cough, sneeze, laugh or change positions.  Do this for the next 2 days.    · Do not operate machinery or power tools for 24 hours.  · A responsible adult  should be with you for the first 24 hours after you arrive home. Do not make any important legal decisions or sign legal papers for 24 hours.  Do not drink alcohol for 24 hours.  · Metformin or any medications containing Metformin should not be taken for 48 hours after your procedure.    Call Your Doctor If:  · You have drainage (other than a small amount of blood on the dressing).  · You have chills or a fever > 101.  · You have redness, warmth, swelling(larger than a walnut), or pain at the insertion site  · .You have heavy bleeding from the site. If this happens, hold pressure on the site and call 911.  · You develop chest pain or shortness of breath, feel faint, or pass out.  · You develop pain, discoloration, coldness, numbness, tingling, or severe bruising in the leg that held the catheter.  · You develop bleeding from any other place, such as the bowels.    Make Sure You:  · Understand these instructions.  · Will watch your condition.  · Will get help right away if you are not doing well or get worse.

## 2021-04-27 NOTE — PERIOPERATIVE NURSING NOTE
Wife at bedside and stated she not yet spoken with Dr. Montejo. RN called Dr. Montejo on  phone and he spoke with wife.

## 2021-04-28 LAB — ACT BLD: 224 SECONDS (ref 82–152)

## 2021-04-28 NOTE — TELEPHONE ENCOUNTER
Pt did not have stents placed. Is it okay for him to proceed with fistula placement from your standpoint? There was a  with collaterals from no stenting.   Thanks,  Nathalie

## 2021-11-30 ENCOUNTER — TELEPHONE (OUTPATIENT)
Dept: CARDIOLOGY | Facility: CLINIC | Age: 66
End: 2021-11-30

## 2022-01-10 ENCOUNTER — APPOINTMENT (OUTPATIENT)
Dept: GENERAL RADIOLOGY | Facility: HOSPITAL | Age: 67
End: 2022-01-10

## 2022-01-10 ENCOUNTER — HOSPITAL ENCOUNTER (INPATIENT)
Facility: HOSPITAL | Age: 67
LOS: 3 days | Discharge: HOME | End: 2022-01-13
Attending: EMERGENCY MEDICINE | Admitting: HOSPITALIST

## 2022-01-10 DIAGNOSIS — N18.6 ESRD (END STAGE RENAL DISEASE): Primary | ICD-10-CM

## 2022-01-10 DIAGNOSIS — E87.70 HYPERVOLEMIA, UNSPECIFIED HYPERVOLEMIA TYPE: ICD-10-CM

## 2022-01-10 LAB
ALBUMIN SERPL-MCNC: 4 G/DL (ref 3.5–5.2)
ALBUMIN/GLOB SERPL: 1.4 G/DL
ALP SERPL-CCNC: 110 U/L (ref 39–117)
ALT SERPL W P-5'-P-CCNC: 20 U/L (ref 1–41)
ANION GAP SERPL CALCULATED.3IONS-SCNC: 18.5 MMOL/L (ref 5–15)
AST SERPL-CCNC: 25 U/L (ref 1–40)
BACTERIA UR QL AUTO: ABNORMAL /HPF
BASOPHILS # BLD AUTO: 0.05 10*3/MM3 (ref 0–0.2)
BASOPHILS NFR BLD AUTO: 0.7 % (ref 0–1.5)
BILIRUB SERPL-MCNC: 0.3 MG/DL (ref 0–1.2)
BILIRUB UR QL STRIP: NEGATIVE
BUN SERPL-MCNC: 101 MG/DL (ref 8–23)
BUN/CREAT SERPL: 16.5 (ref 7–25)
CALCIUM SPEC-SCNC: 9.1 MG/DL (ref 8.6–10.5)
CHLORIDE SERPL-SCNC: 101 MMOL/L (ref 98–107)
CLARITY UR: CLEAR
CO2 SERPL-SCNC: 20.5 MMOL/L (ref 22–29)
COLOR UR: YELLOW
CREAT SERPL-MCNC: 6.13 MG/DL (ref 0.76–1.27)
DEPRECATED RDW RBC AUTO: 48.1 FL (ref 37–54)
EOSINOPHIL # BLD AUTO: 0.07 10*3/MM3 (ref 0–0.4)
EOSINOPHIL NFR BLD AUTO: 0.9 % (ref 0.3–6.2)
ERYTHROCYTE [DISTWIDTH] IN BLOOD BY AUTOMATED COUNT: 13.4 % (ref 12.3–15.4)
GFR SERPL CREATININE-BSD FRML MDRD: 9 ML/MIN/1.73
GFR SERPL CREATININE-BSD FRML MDRD: ABNORMAL ML/MIN/{1.73_M2}
GLOBULIN UR ELPH-MCNC: 2.8 GM/DL
GLUCOSE BLDC GLUCOMTR-MCNC: 137 MG/DL (ref 70–130)
GLUCOSE BLDC GLUCOMTR-MCNC: 94 MG/DL (ref 70–130)
GLUCOSE SERPL-MCNC: 157 MG/DL (ref 65–99)
GLUCOSE UR STRIP-MCNC: NEGATIVE MG/DL
GRAN CASTS URNS QL MICRO: ABNORMAL /LPF
HBV SURFACE AG SERPL QL IA: NORMAL
HCT VFR BLD AUTO: 33 % (ref 37.5–51)
HGB BLD-MCNC: 10.3 G/DL (ref 13–17.7)
HGB UR QL STRIP.AUTO: NEGATIVE
HYALINE CASTS UR QL AUTO: ABNORMAL /LPF
IMM GRANULOCYTES # BLD AUTO: 0.04 10*3/MM3 (ref 0–0.05)
IMM GRANULOCYTES NFR BLD AUTO: 0.5 % (ref 0–0.5)
KETONES UR QL STRIP: NEGATIVE
LEUKOCYTE ESTERASE UR QL STRIP.AUTO: NEGATIVE
LYMPHOCYTES # BLD AUTO: 0.79 10*3/MM3 (ref 0.7–3.1)
LYMPHOCYTES NFR BLD AUTO: 10.4 % (ref 19.6–45.3)
MCH RBC QN AUTO: 30.4 PG (ref 26.6–33)
MCHC RBC AUTO-ENTMCNC: 31.2 G/DL (ref 31.5–35.7)
MCV RBC AUTO: 97.3 FL (ref 79–97)
MONOCYTES # BLD AUTO: 1.02 10*3/MM3 (ref 0.1–0.9)
MONOCYTES NFR BLD AUTO: 13.5 % (ref 5–12)
NEUTROPHILS NFR BLD AUTO: 5.6 10*3/MM3 (ref 1.7–7)
NEUTROPHILS NFR BLD AUTO: 74 % (ref 42.7–76)
NITRITE UR QL STRIP: NEGATIVE
NRBC BLD AUTO-RTO: 0 /100 WBC (ref 0–0.2)
PH UR STRIP.AUTO: <=5 [PH] (ref 5–8)
PLATELET # BLD AUTO: 268 10*3/MM3 (ref 140–450)
PMV BLD AUTO: 10.6 FL (ref 6–12)
POTASSIUM SERPL-SCNC: 4.2 MMOL/L (ref 3.5–5.2)
PROT SERPL-MCNC: 6.8 G/DL (ref 6–8.5)
PROT UR QL STRIP: ABNORMAL
QT INTERVAL: 505 MS
RBC # BLD AUTO: 3.39 10*6/MM3 (ref 4.14–5.8)
RBC # UR STRIP: ABNORMAL /HPF
REF LAB TEST METHOD: ABNORMAL
SARS-COV-2 RNA RESP QL NAA+PROBE: NOT DETECTED
SODIUM SERPL-SCNC: 140 MMOL/L (ref 136–145)
SP GR UR STRIP: 1.01 (ref 1–1.03)
SQUAMOUS #/AREA URNS HPF: ABNORMAL /HPF
UROBILINOGEN UR QL STRIP: ABNORMAL
WBC # UR STRIP: ABNORMAL /HPF
WBC NRBC COR # BLD: 7.57 10*3/MM3 (ref 3.4–10.8)
YEAST URNS QL MICRO: ABNORMAL /HPF

## 2022-01-10 PROCEDURE — 80053 COMPREHEN METABOLIC PANEL: CPT | Performed by: EMERGENCY MEDICINE

## 2022-01-10 PROCEDURE — 81001 URINALYSIS AUTO W/SCOPE: CPT | Performed by: EMERGENCY MEDICINE

## 2022-01-10 PROCEDURE — 99221 1ST HOSP IP/OBS SF/LOW 40: CPT | Performed by: SURGERY

## 2022-01-10 PROCEDURE — U0005 INFEC AGEN DETEC AMPLI PROBE: HCPCS | Performed by: EMERGENCY MEDICINE

## 2022-01-10 PROCEDURE — 87340 HEPATITIS B SURFACE AG IA: CPT | Performed by: INTERNAL MEDICINE

## 2022-01-10 PROCEDURE — 93005 ELECTROCARDIOGRAM TRACING: CPT | Performed by: EMERGENCY MEDICINE

## 2022-01-10 PROCEDURE — 5A1D70Z PERFORMANCE OF URINARY FILTRATION, INTERMITTENT, LESS THAN 6 HOURS PER DAY: ICD-10-PCS | Performed by: INTERNAL MEDICINE

## 2022-01-10 PROCEDURE — 71045 X-RAY EXAM CHEST 1 VIEW: CPT

## 2022-01-10 PROCEDURE — 82962 GLUCOSE BLOOD TEST: CPT

## 2022-01-10 PROCEDURE — U0003 INFECTIOUS AGENT DETECTION BY NUCLEIC ACID (DNA OR RNA); SEVERE ACUTE RESPIRATORY SYNDROME CORONAVIRUS 2 (SARS-COV-2) (CORONAVIRUS DISEASE [COVID-19]), AMPLIFIED PROBE TECHNIQUE, MAKING USE OF HIGH THROUGHPUT TECHNOLOGIES AS DESCRIBED BY CMS-2020-01-R: HCPCS | Performed by: EMERGENCY MEDICINE

## 2022-01-10 PROCEDURE — 99284 EMERGENCY DEPT VISIT MOD MDM: CPT

## 2022-01-10 PROCEDURE — 36415 COLL VENOUS BLD VENIPUNCTURE: CPT | Performed by: INTERNAL MEDICINE

## 2022-01-10 PROCEDURE — 85025 COMPLETE CBC W/AUTO DIFF WBC: CPT | Performed by: EMERGENCY MEDICINE

## 2022-01-10 RX ORDER — HYDRALAZINE HYDROCHLORIDE 50 MG/1
100 TABLET, FILM COATED ORAL 3 TIMES DAILY
Status: DISCONTINUED | OUTPATIENT
Start: 2022-01-10 | End: 2022-01-11

## 2022-01-10 RX ORDER — ASPIRIN 81 MG/1
81 TABLET ORAL DAILY
Status: DISCONTINUED | OUTPATIENT
Start: 2022-01-10 | End: 2022-01-13 | Stop reason: HOSPADM

## 2022-01-10 RX ORDER — TACROLIMUS 1 MG/1
1 CAPSULE ORAL 2 TIMES DAILY
Status: DISCONTINUED | OUTPATIENT
Start: 2022-01-10 | End: 2022-01-13 | Stop reason: HOSPADM

## 2022-01-10 RX ORDER — FUROSEMIDE 80 MG
80 TABLET ORAL 2 TIMES DAILY
Status: DISCONTINUED | OUTPATIENT
Start: 2022-01-10 | End: 2022-01-13 | Stop reason: HOSPADM

## 2022-01-10 RX ORDER — INSULIN LISPRO 100 [IU]/ML
0-7 INJECTION, SOLUTION INTRAVENOUS; SUBCUTANEOUS
Status: DISCONTINUED | OUTPATIENT
Start: 2022-01-10 | End: 2022-01-13 | Stop reason: HOSPADM

## 2022-01-10 RX ORDER — CALCITRIOL 0.25 UG/1
0.25 CAPSULE, LIQUID FILLED ORAL
Status: DISCONTINUED | OUTPATIENT
Start: 2022-01-11 | End: 2022-01-13 | Stop reason: HOSPADM

## 2022-01-10 RX ORDER — INSULIN LISPRO 100 [IU]/ML
5 INJECTION, SOLUTION INTRAVENOUS; SUBCUTANEOUS
Status: DISCONTINUED | OUTPATIENT
Start: 2022-01-10 | End: 2022-01-13 | Stop reason: HOSPADM

## 2022-01-10 RX ORDER — BUMETANIDE 0.25 MG/ML
2 INJECTION INTRAMUSCULAR; INTRAVENOUS ONCE
Status: DISCONTINUED | OUTPATIENT
Start: 2022-01-10 | End: 2022-01-10

## 2022-01-10 RX ORDER — FERROUS SULFATE 325(65) MG
325 TABLET ORAL
Status: DISCONTINUED | OUTPATIENT
Start: 2022-01-10 | End: 2022-01-13 | Stop reason: HOSPADM

## 2022-01-10 RX ORDER — SODIUM CHLORIDE 0.9 % (FLUSH) 0.9 %
10 SYRINGE (ML) INJECTION AS NEEDED
Status: DISCONTINUED | OUTPATIENT
Start: 2022-01-10 | End: 2022-01-13 | Stop reason: HOSPADM

## 2022-01-10 RX ORDER — TACROLIMUS 1 MG/1
1 CAPSULE ORAL 2 TIMES DAILY
COMMUNITY

## 2022-01-10 RX ORDER — BUMETANIDE 0.25 MG/ML
2 INJECTION INTRAMUSCULAR; INTRAVENOUS ONCE
Status: COMPLETED | OUTPATIENT
Start: 2022-01-10 | End: 2022-01-10

## 2022-01-10 RX ORDER — ROSUVASTATIN CALCIUM 20 MG/1
20 TABLET, COATED ORAL DAILY
Status: DISCONTINUED | OUTPATIENT
Start: 2022-01-10 | End: 2022-01-11

## 2022-01-10 RX ORDER — ALLOPURINOL 100 MG/1
100 TABLET ORAL 2 TIMES DAILY
Status: DISCONTINUED | OUTPATIENT
Start: 2022-01-10 | End: 2022-01-13 | Stop reason: HOSPADM

## 2022-01-10 RX ORDER — PREDNISONE 10 MG/1
5 TABLET ORAL DAILY
Status: DISCONTINUED | OUTPATIENT
Start: 2022-01-10 | End: 2022-01-13 | Stop reason: HOSPADM

## 2022-01-10 RX ORDER — AMLODIPINE BESYLATE 5 MG/1
2.5 TABLET ORAL 2 TIMES DAILY
Status: DISCONTINUED | OUTPATIENT
Start: 2022-01-10 | End: 2022-01-10

## 2022-01-10 RX ORDER — AMOXICILLIN 250 MG
2 CAPSULE ORAL NIGHTLY
Status: DISCONTINUED | OUTPATIENT
Start: 2022-01-10 | End: 2022-01-13 | Stop reason: HOSPADM

## 2022-01-10 RX ORDER — CLOPIDOGREL BISULFATE 75 MG/1
75 TABLET ORAL DAILY
COMMUNITY
Start: 2021-08-19

## 2022-01-10 RX ORDER — CLONIDINE HYDROCHLORIDE 0.1 MG/1
0.1 TABLET ORAL 3 TIMES DAILY
Status: DISCONTINUED | OUTPATIENT
Start: 2022-01-10 | End: 2022-01-11

## 2022-01-10 RX ORDER — LIDOCAINE AND PRILOCAINE 25; 25 MG/G; MG/G
1 CREAM TOPICAL ONCE
Status: CANCELLED | OUTPATIENT
Start: 2022-01-10

## 2022-01-10 RX ORDER — FAMOTIDINE 20 MG/1
20 TABLET, FILM COATED ORAL 2 TIMES DAILY
Status: DISCONTINUED | OUTPATIENT
Start: 2022-01-10 | End: 2022-01-11

## 2022-01-10 RX ORDER — CLOPIDOGREL BISULFATE 75 MG/1
75 TABLET ORAL DAILY
Status: DISCONTINUED | OUTPATIENT
Start: 2022-01-10 | End: 2022-01-13 | Stop reason: HOSPADM

## 2022-01-10 RX ORDER — CALCITRIOL 0.25 UG/1
0.25 CAPSULE, LIQUID FILLED ORAL SEE ADMIN INSTRUCTIONS
Status: DISCONTINUED | OUTPATIENT
Start: 2022-01-10 | End: 2022-01-10

## 2022-01-10 RX ORDER — INSULIN LISPRO 100 [IU]/ML
6 INJECTION, SOLUTION INTRAVENOUS; SUBCUTANEOUS
Status: DISCONTINUED | OUTPATIENT
Start: 2022-01-10 | End: 2022-01-10

## 2022-01-10 RX ORDER — AMLODIPINE BESYLATE 5 MG/1
5 TABLET ORAL
Status: DISCONTINUED | OUTPATIENT
Start: 2022-01-10 | End: 2022-01-11

## 2022-01-10 RX ORDER — LEVOTHYROXINE SODIUM 0.05 MG/1
50 TABLET ORAL DAILY
Status: DISCONTINUED | OUTPATIENT
Start: 2022-01-10 | End: 2022-01-13 | Stop reason: HOSPADM

## 2022-01-10 RX ADMIN — SENNOSIDES AND DOCUSATE SODIUM 2 TABLET: 8.6; 5 TABLET ORAL at 23:00

## 2022-01-10 RX ADMIN — INSULIN GLARGINE-YFGN 15 UNITS: 100 INJECTION, SOLUTION SUBCUTANEOUS at 22:22

## 2022-01-10 RX ADMIN — METOPROLOL TARTRATE 25 MG: 25 TABLET, FILM COATED ORAL at 22:41

## 2022-01-10 RX ADMIN — BUMETANIDE 2 MG: 0.25 INJECTION INTRAMUSCULAR; INTRAVENOUS at 09:04

## 2022-01-10 RX ADMIN — FAMOTIDINE 20 MG: 20 TABLET, FILM COATED ORAL at 22:42

## 2022-01-10 NOTE — ED TRIAGE NOTES
To ER via PV.  C/o SOA and weakness on any exertion.  Pt states stage 4 kidney failure.  Pt had kidney transplant in 2014 that has failed.  Pt reports orthopnea as well.    Fistula placed 2 mos ago for impending need for dialysis.      Pt in mask at time of triage,  Triage staff in appropriate PPE.

## 2022-01-10 NOTE — CONSULTS
[facility] Consult Note      Patient Name: Dilshad Cee  : 1955  MRN: [id number]  Primary Care Physician:  Sam Marie MD  Referring Physician: Sohail Padron MD  Date of admission: 1/10/2022    Subjective     Reason for Consult: CKD 5 with uremic symptoms and failing kidney transplant    HPI:   Dilshad Cee is a 66 y.o. male the patient presented to the emergency department with increasing shortness of breath with orthopnea and PND for the past few days he had decreased appetite and increasing fatigue also he had metallic taste.  He has history of  donor kidney transplant in 2014 for IgA nephropathy he was preemptively transplanted before needing dialysis.  His renal function was gradually declining and he had an AV fistula created in his right upper arm in 2021 in preparation for dialysis.  Past medical history includes hypertension, gout, IgA nephropathy of the native kidneys, failing  donor kidney transplant, metabolic bone disease with secondary hyperparathyroidism, dyslipidemia and anemia of chronic kidney disease, diabetes mellitus type 2 diagnosed and treated since his transplant, he had coronary artery disease with prior three-vessel CABG in , he had bilateral carotid disease and he had recent arteriogram revealed less than 60% obstruction bilaterally    He has no nausea or vomiting, has decreased appetite and metallic taste, sleep disturbance, and generalized weakness also he has orthopnea and PND but no chest pain no palpitation.    Review of Systems:   14 point review of systems is otherwise negative except for mentioned above on HPI    Personal History     Past Medical History:   Diagnosis Date   • Anemia    • Chronic kidney disease    • Coronary artery disease    • Gout    • History of cardiac cath     2011 - Revealed right atrial mean of 7. RV 35/8. PA 33/15. PA mean 19. Wedge 14. Cardiac index 2.71.  Left main distal 20%. LAD proximal 90%, mid diaginal ostial 80%. Circumflex small but normal. RCA dominant proximal 60%.   • History of echocardiogram    • History of nuclear stress test     3/14/2017 Small fixed inferior wall defect, suggestive of attenuation. Mild septal hypokinesis. EF 58%. 10/11/2011 Reveals LVEF 59%, possible distal apical wall ischemia.   • Hyperlipidemia    • Hypertension    • Renal transplant recipient    • Respiratory insufficiency        Past Surgical History:   Procedure Laterality Date   • AMPUTATION      LEFT 3RD DIGIT   • CARDIAC CATHETERIZATION N/A 4/27/2021    Procedure: Left Heart Cath;  Surgeon: Denis Montejo MD;  Location: [facility] CATH INVASIVE LOCATION;  Service: Cardiology;  Laterality: N/A;   • CARDIAC CATHETERIZATION N/A 4/27/2021    Procedure: Coronary angiography;  Surgeon: Denis Montejo MD;  [facility]ATH [facility] LOC[facility];  Service: Cardiology;  Laterality: N/A;   • CARDIAC CATHETERIZATION  4/27/2021    Procedure: Saphenous Vein Graft;  Surgeon: Denis Montejo MD;  Location: [facility] CATH INVASIVE LOCATION;  Service: Cardiology;;   • CARPAL TUNNEL RELEASE Bilateral    • CORONARY ARTERY BYPASS GRAFT  12/15/2011    LIMA to LAD, SVG to OM1 and RCA. By Dr Ortiz   • KNEE ARTHROSCOPY W/ MENISCAL REPAIR Right    • TRANSPLANTATION RENAL  02/05/2014       Family History: family history includes Alcohol abuse in his brother; Cancer in his mother; Heart disease in his father; Hyperlipidemia in his brother; Hypertension in his father.    Social History:  reports that he has never smoked. He has never used smokeless tobacco. He reports that he does not use drugs.    Home Medications:  Prior to Admission medications    Medication Sig Start Date End Date Taking? Authorizing Provider   allopurinol (ZYLOPRIM) 100 MG tablet Take 100 mg by mouth 2 (Two) Times a Day.   Yes Provider, MD Tuyet   amLODIPine (NORVASC) 2.5 MG tablet Take 2.5 mg by  mouth 2 (Two) Times a Day. 3/5/21  Yes Evgeny Sunshine MD   aspirin 81 MG EC tablet Take 81 mg by mouth Daily.   Yes Tuyet Lincoln MD   calcitriol (ROCALTROL) 0.25 MCG capsule Take 0.25 mcg by mouth See Admin Instructions. Takes .25mg 2 times daily, every other day.   Yes Tuyet Lincoln MD   cloNIDine (CATAPRES) 0.1 MG tablet Take 0.1 mg by mouth 3 (Three) Times a Day.   Yes Tuyet Lincoln MD   clopidogrel (PLAVIX) 75 MG tablet Take 75 mg by mouth Daily. 8/19/21  Yes Tuyet Lincoln MD   everolimus (ZORTRESS) 0.75 MG tablet Take 0.75 mg by mouth 2 (Two) Times a Day.   Yes Tuyet Lincoln MD   ferrous sulfate (FeroSul) 220 (44 Fe) MG/5ML solution Take 5 mL by mouth Daily.   Yes Tuyet Lincoln MD   furosemide (LASIX) 80 MG tablet Take 80 mg by mouth 2 (Two) Times a Day.   Yes Tuyet Lincoln MD   hydrALAZINE (APRESOLINE) 100 MG tablet Take 100 mg by mouth 3 (Three) Times a Day.   Yes Tuyet Lincoln MD   insulin aspart (novoLOG) 100 UNIT/ML injection Inject 6 Units under the skin into the appropriate area as directed 3 (Three) Times a Day Before Meals.   Yes Provider, Historical, MD   insulin detemir (LEVEMIR) 100 UNIT/ML injection Inject 9 Units under the skin into the appropriate area as directed Every Night.   Yes Tuyet Lincoln MD   levothyroxine (SYNTHROID, LEVOTHROID) 50 MCG tablet Take 50 mcg by mouth Daily.   Yes Tuyet Lincoln MD   metoprolol tartrate (LOPRESSOR) 25 MG tablet Take 25 mg by mouth 2 (Two) Times a Day.   Yes Tuyet Lincoln MD   Multiple Vitamins-Minerals (MULTIVITAMIN ADULT PO) Take 1 tablet by mouth Daily.   Yes Provider, Historical, MD   predniSONE (DELTASONE) 5 MG tablet Take 5 mg by mouth Daily.   Yes Provider, Historical, MD   rosuvastatin (CRESTOR) 20 MG tablet Take 20 mg by mouth Daily.   Yes Tuyet Lincoln MD   tacrolimus (PROGRAF) 1 MG capsule Take 1 mg by mouth 2 (Two) Times a Day.   Yes Salazar  Historical, MD       Allergies:  Allergies   Allergen Reactions   • Cephalexin Unknown (See Comments) and Rash     .       Objective     Vitals:   Temp:  [96.5 °F (35.8 °C)] 96.5 °F (35.8 °C)  Heart Rate:  [67] 67  Resp:  [24] 24  BP: (145)/(85) 145/85  No intake or output data in the 24 hours ending 01/10/22 1026    Physical Exam:   Constitutional: Awake, alert, no acute distress.  Appears to be chronically ill  HEENT: Sclera anicteric, no conjunctival injection  Neck: Supple, no thyromegaly, no lymphadenopathy, trachea at midline, mild JVD  Respiratory: Scattered rhonchi, nonlabored respiration  Cardiovascular: RRR, no murmurs, no rubs or gallops, bilateral carotid bruit  Gastrointestinal: Positive bowel sounds, abdomen is soft, nontender and nondistended, he has a reducible umbilical hernia  : No palpable bladder  Musculoskeletal: He has an AV fistula in the right upper extremity with good thrill and bruit, he has 2+ lower extremity edema, no cyanosis or clubbing  Psychiatric: Appropriate affect, cooperative  Neurologic: Oriented x3, moving all extremities, normal speech and mental status  Skin: Warm and dry       Scheduled Meds:        IV Meds:        Results Reviewed:   I have personally reviewed the results from the time of this admission to 1/10/2022 10:26 EST     Lab Results   Component Value Date    GLUCOSE 157 (H) 01/10/2022    CALCIUM 9.1 01/10/2022     01/10/2022    K 4.2 01/10/2022    CO2 20.5 (L) 01/10/2022     01/10/2022     (H) 01/10/2022    CREATININE 6.13 (H) 01/10/2022    EGFRIFAFRI  01/10/2022      Comment:      <15 Indicative of kidney failure.    EGFRIFNONA 9 (L) 01/10/2022    BCR 16.5 01/10/2022    ANIONGAP 18.5 (H) 01/10/2022      Lab Results   Component Value Date    ALBUMIN 4.00 01/10/2022           Assessment / Plan     ASSESSMENT:  -CKD stage V secondary to failing kidney transplant with uremic symptoms  - donor kidney transplant in 2014 secondary to  IgA nephropathy allograft is failing  -Coronary artery disease prior CABG 2011 and he had symptoms of congestive heart failure  -Carotid disease with bilateral disease less than 60% being followed and observed by vascular surgery  -Anemia of chronic kidney disease  -Metabolic bone disease with secondary hyperparathyroidism secondary to renal failure  -Diabetes mellitus type 2 diagnosed and treated since his kidney transplant in 2014  -Umbilical hernia  -Hypervolemia    PLAN:  1. Will initiate hemodialysis today and challenge fluid removal  2.  I discussed with the patient home dialysis and he is interested I will ask Dr. Portillo to see him regarding PD catheter  3. Surveillance labs    I had long discussion with the patient and his wife at the bedside and they voiced good understanding, also I discussed the case with Dr. Pascal      Thank you for involving us in the care of Dilshad Cee.  Please feel free to call with any questions.    Tristen Evans MD  01/10/22  10:26 [facility]    [facility]  [phone]      Much of this encounter note is an electronic transcription/translation of spoken language to printed text. The electronic translation of spoken language may permit erroneous, or at times, nonsensical words or phrases to be inadvertently transcribed; Although I have reviewed the note for such errors, some may still exist.

## 2022-01-10 NOTE — ED PROVIDER NOTES
EMERGENCY DEPARTMENT ENCOUNTER    Room Number:  42/42  Date of encounter:  1/10/2022  PCP: Sam Marie MD  Historian: Patient      HPI:  Chief Complaint: Shortness of breath  A complete HPI/ROS/PMH/PSH/SH/FH are unobtainable due to: Nothing    Context: Dilshad Cee is a 66 y.o. male who presents to the ED c/o worsening, moderate severity shortness of breath, particular with exertion and recumbency that is been worsening now for about 3 to 4 days.  Patient said he had no fever cough, but now he can only take about 5 steps across the room without having to sit down due to his shortness of breath.  There is no chest pain.    Patient has history of renal transplant, and this has been rejected and is now in end-stage renal disease pending dialysis.  He has not yet been dialyzed, and he follows with Dr. Garcia from nephrology.  He has a mature fistula in his right upper arm that not been used, but he says that he is told it is ready.    He says he is not gained a whole lot of weight, he has been taking his diuretics, and he still makes urine      PAST MEDICAL HISTORY  Active Ambulatory Problems     Diagnosis Date Noted   • Hyperlipidemia    • Gout    • Anemia    • Hypertension    • Coronary artery disease    • CKD (chronic kidney disease) stage 4, GFR 15-29 ml/min ([facility])    • Respiratory insufficiency    • Renal transplant recipient    • History of echocardiogram    • History of nuclear stress test    • History of cardiac cath    • History of coronary artery bypass graft 03/05/2021   • Moderate tricuspid insufficiency 03/05/2021   • Trace aortic valve regurgitation 03/05/2021   • Type 2 diabetes mellitus with circulatory disorder, with long-term current use of insulin ([facility]) 03/05/2021   • Obstructive sleep apnea on CPAP 03/05/2021   • Coronary artery disease involving native coronary artery of native heart without angina pectoris 04/13/2021   • Positive cardiac stress test 04/13/2021     Resolved  Ambulatory Problems     Diagnosis Date Noted   • No Resolved Ambulatory Problems     Past Medical History:   Diagnosis Date   • Chronic kidney disease          PAST SURGICAL HISTORY  Past Surgical History:   Procedure Laterality Date   • AMPUTATION      LEFT 3RD DIGIT   • CARDIAC CATHETERIZATION N/A 4/27/2021    Procedure: Left Heart Cath;  Surgeon: Denis Montejo MD;  Location:  [facility] CATH INVASIVE LOCATION;  Service: Cardiology;  Laterality: N/A;   • CARDIAC CATHETERIZATION N/A 4/27/2021    Procedure: Coronary angiography;  Surgeon: Denis Montejo MD;  Location:  [facility] CATH INVASIVE LOCATION;  Service: Cardiology;  Laterality: N/A;   • CARDIAC CATHETERIZATION  4/27/2021    Procedure: Saphenous Vein Graft;  Surgeon: Denis Montejo MD;  Location:  [facility] CATH INVASIVE LOCATION;  Service: Cardiology;;   • CARPAL TUNNEL RELEASE Bilateral    • CORONARY ARTERY BYPASS GRAFT  12/15/2011    LIMA to LAD, SVG to OM1 and RCA. By Dr Ortiz   • KNEE ARTHROSCOPY W/ MENISCAL REPAIR Right    • TRANSPLANTATION RENAL  02/05/2014         FAMILY HISTORY  Family History   Problem Relation Age of Onset   • Cancer Mother    • Heart disease Father    • Hypertension Father    • Alcohol abuse Brother    • Hyperlipidemia Brother          SOCIAL HISTORY  Social History     Socioeconomic History   • Marital status:    Tobacco Use   • Smoking status: Never Smoker   • Smokeless tobacco: Never Used   Substance and Sexual Activity   • Drug use: Never   • Sexual activity: Defer         ALLERGIES  Cephalexin        REVIEW OF SYSTEMS  Review of Systems   All systems reviewed and negative except for those discussed in HPI.       PHYSICAL EXAM    I have reviewed the triage vital signs and nursing notes.    ED Triage Vitals [01/10/22 0706]   Temp Heart Rate Resp BP SpO2   96.5 °F (35.8 °C) 67 24 145/85 97 %      Temp src Heart Rate Source Patient Position BP Location FiO2 (%)   Tympanic Monitor -- -- --        Physical Exam  GENERAL: not distressed  HENT: nares patent, JVD is present  EYES: no scleral icterus  CV: regular rhythm, regular rate  RESPIRATORY: normal effort, rales in the bases bilaterally and slightly tachypneic  ABDOMEN: soft  MUSCULOSKELETAL: no deformity, 1+ pedal edema bilaterally  NEURO: alert, moves all extremities, follows commands  SKIN: warm, dry        LAB RESULTS  Recent Results (from the past 24 hour(s))   Comprehensive Metabolic Panel    Collection Time: 01/10/22  7:33 AM    Specimen: Blood   Result Value Ref Range    Glucose 157 (H) 65 - 99 mg/dL     (H) 8 - 23 mg/dL    Creatinine 6.13 (H) 0.76 - 1.27 mg/dL    Sodium 140 136 - 145 mmol/L    Potassium 4.2 3.5 - 5.2 mmol/L    Chloride 101 98 - 107 mmol/L    CO2 20.5 (L) 22.0 - 29.0 mmol/L    Calcium 9.1 8.6 - 10.5 mg/dL    Total Protein 6.8 6.0 - 8.5 g/dL    Albumin 4.00 3.50 - 5.20 g/dL    ALT (SGPT) 20 1 - 41 U/L    AST (SGOT) 25 1 - 40 U/L    Alkaline Phosphatase 110 39 - 117 U/L    Total Bilirubin 0.3 0.0 - 1.2 mg/dL    eGFR Non African Amer 9 (L) >60 mL/min/1.73    eGFR  African Amer      Globulin 2.8 gm/dL    A/G Ratio 1.4 g/dL    BUN/Creatinine Ratio 16.5 7.0 - 25.0    Anion Gap 18.5 (H) 5.0 - 15.0 mmol/L   COVID-19,[facility] IN-HOUSE CEPHEID/ELOISA NP SWAB IN TRANSPORT MEDIA 8-12 HR TAT - Swab, Nasopharynx    Collection Time: 01/10/22  7:33 AM    Specimen: Nasopharynx; Swab   Result Value Ref Range    COVID19 Not Detected Not Detected - Ref. Range   CBC Auto Differential    Collection Time: 01/10/22  7:33 AM    Specimen: Blood   Result Value Ref Range    WBC 7.57 3.40 - 10.80 10*3/mm3    RBC 3.39 (L) 4.14 - 5.80 10*6/mm3    Hemoglobin 10.3 (L) 13.0 - 17.7 g/dL    Hematocrit 33.0 (L) 37.5 - 51.0 %    MCV 97.3 (H) 79.0 - 97.0 fL    MCH 30.4 26.6 - 33.0 pg    MCHC 31.2 (L) 31.5 - 35.7 g/dL    RDW 13.4 12.3 - 15.4 %    RDW-SD 48.1 37.0 - 54.0 fl    MPV 10.6 6.0 - 12.0 fL    Platelets 268 140 - 450 10*3/mm3    Neutrophil % 74.0 42.7 -  76.0 %    Lymphocyte % 10.4 (L) 19.6 - 45.3 %    Monocyte % 13.5 (H) 5.0 - 12.0 %    Eosinophil % 0.9 0.3 - 6.2 %    Basophil % 0.7 0.0 - 1.5 %    Immature Grans % 0.5 0.0 - 0.5 %    Neutrophils, Absolute 5.60 1.70 - 7.00 10*3/mm3    Lymphocytes, Absolute 0.79 0.70 - 3.10 10*3/mm3    Monocytes, Absolute 1.02 (H) 0.10 - 0.90 10*3/mm3    Eosinophils, Absolute 0.07 0.00 - 0.40 10*3/mm3    Basophils, Absolute 0.05 0.00 - 0.20 10*3/mm3    Immature Grans, Absolute 0.04 0.00 - 0.05 10*3/mm3    nRBC 0.0 0.0 - 0.2 /100 WBC   ECG 12 Lead    Collection Time: 01/10/22  7:42 AM   Result Value Ref Range    QT Interval 505 ms   Urinalysis With Microscopic If Indicated (No Culture) - Urine, Clean Catch    Collection Time: 01/10/22  8:13 AM    Specimen: Urine, Clean Catch   Result Value Ref Range    Color, UA Yellow Yellow, Straw    Appearance, UA Clear Clear    pH, UA <=5.0 5.0 - 8.0    Specific Gravity, UA 1.012 1.005 - 1.030    Glucose, UA Negative Negative    Ketones, UA Negative Negative    Bilirubin, UA Negative Negative    Blood, UA Negative Negative    Protein, UA 30 mg/dL (1+) (A) Negative    Leuk Esterase, UA Negative Negative    Nitrite, UA Negative Negative    Urobilinogen, UA 0.2 E.U./dL 0.2 - 1.0 E.U./dL   Urinalysis, Microscopic Only - Urine, Clean Catch    Collection Time: 01/10/22  8:13 AM    Specimen: Urine, Clean Catch   Result Value Ref Range    RBC, UA None Seen None Seen, 0-2 /HPF    WBC, UA 0-2 None Seen, 0-2 /HPF    Bacteria, UA Trace (A) None Seen /HPF    Squamous Epithelial Cells, UA None Seen None Seen, 0-2 /HPF    Yeast, UA Small/1+ Budding Yeast None Seen /HPF    Hyaline Casts, UA 0-2 None Seen /LPF    Granular Casts, UA 0-2 None Seen /LPF    Methodology Manual Light Microscopy    Hepatitis B Surface Antigen    Collection Time: 01/10/22  2:21 PM    Specimen: Blood   Result Value Ref Range    Hepatitis B Surface Ag Non-Reactive Non-Reactive       Ordered the above labs and independently reviewed the  results.        RADIOLOGY  XR Chest 1 View    Result Date: 1/10/2022  PORTABLE CHEST X-RAY  HISTORY: Shortness of breath.  TECHNIQUE: Portable chest x-ray is provided. No previous imaging for correlation.  FINDINGS: There are sternal wires and cardiomegaly. Vascular volume is normal. The lungs are clear and the costophrenic sulci are dry.      Evidence of previous sternotomy with cardiomegaly but no acute appearing cardiopulmonary abnormality.  This report was finalized on 1/10/2022 7:51 AM by Dr. Kvng Elliott M.D.        I ordered the above noted radiological studies. Reviewed by me and discussed with radiologist.  See dictation for official radiology interpretation.      PROCEDURES    Procedures      MEDICATIONS GIVEN IN ER    Medications   sodium chloride 0.9 % flush 10 mL (has no administration in time range)   allopurinol (ZYLOPRIM) tablet 100 mg (has no administration in time range)   aspirin EC tablet 81 mg (has no administration in time range)   cloNIDine (CATAPRES) tablet 0.1 mg (has no administration in time range)   clopidogrel (PLAVIX) tablet 75 mg (has no administration in time range)   ferrous sulfate tablet 325 mg (has no administration in time range)   furosemide (LASIX) tablet 80 mg (has no administration in time range)   hydrALAZINE (APRESOLINE) tablet 100 mg (has no administration in time range)   insulin glargine (LANTUS, SEMGLEE) injection 15 Units (has no administration in time range)   levothyroxine (SYNTHROID, LEVOTHROID) tablet 50 mcg (has no administration in time range)   metoprolol tartrate (LOPRESSOR) tablet 25 mg (has no administration in time range)   predniSONE (DELTASONE) tablet 5 mg (has no administration in time range)   rosuvastatin (CRESTOR) tablet 20 mg (has no administration in time range)   tacrolimus (PROGRAF) capsule 1 mg (has no administration in time range)   amLODIPine (NORVASC) tablet 5 mg (has no administration in time range)   insulin lispro (ADMELOG) injection 5  Units (has no administration in time range)   insulin lispro (ADMELOG) injection 0-7 Units (has no administration in time range)   calcitriol (ROCALTROL) capsule 0.25 mcg (has no administration in time range)   famotidine (PEPCID) tablet 20 mg (has no administration in time range)   bumetanide (BUMEX) injection 2 mg (2 mg Intravenous Given 1/10/22 0904)         PROGRESS, DATA ANALYSIS, CONSULTS, AND MEDICAL DECISION MAKING    All labs have been independently reviewed by me.  All radiology studies have been reviewed by me and discussed with radiologist dictating the report.   EKG's independently viewed and interpreted by me.  Discussion below represents my analysis of pertinent findings related to patient's condition, differential diagnosis, treatment plan and final disposition.        ED Course as of 01/10/22 1530   Mon Zack 10, 2022   1529 CBC shows a chronic stable anemia [DP]   1529 Chemistry shows severe end-stage renal disease with normal potassium and a mild metabolic acidosis. [DP]   1529 Urinalysis shows 30+ protein otherwise negative [DP]   1529 Chest x-ray shows cardiomegaly and some early mild vascular congestion [DP]   1529 EKG on arrival  Normal sinus rhythm and there is significant artifact in the precordial leads laterally.  There is right bundle branch block, but no significant ST segment changes are seen.  This is slightly changed when compared to March 28, 2017 but not in a meaningful way [DP]   1530 I have spoken with Dr. Evans who was kind enough to come to the ED and evaluate the patient.  We have agreed that is time for this patient start dialysis.  I spoke with Dr. Padron who agrees to admit the patient to a telemetry bed, and Dr. Soliz will give dialysis orders [DP]      ED Course User Index  [DP] Rodger Pascal MD           PPE: The patient wore a surgical mask throughout the entire patient encounter. I wore an N95.    AS OF 15:30 EST VITALS:    BP - 141/83  HR - 63  TEMP - 96.5 °F (35.8  °C) (Tympanic)  O2 SATS - 95%        DIAGNOSIS  Final diagnoses:   ESRD (end stage renal disease) (HCC)   Hypervolemia, unspecified hypervolemia type         DISPOSITION  Admit to telemetry           Rodger Pascal MD  01/10/22 1935

## 2022-01-10 NOTE — H&P
History and physical    Primary care physician  Dr. HADDAD    Chief complaint  Shortness of breath    History of present illness  66-year-old very pleasant white male with history of kidney transplant in 2014 other medical problems hypertension hyperlipidemia hypothyroidism diabetes mellitus osteoarthritis gout chronic anemia presented to [facility] emergency room with shortness of breath with generalized weakness no appetite increased fatigue.  Patient denies any fever chills cough chest pain abdominal pain vomiting diarrhea.  Patient does have some nausea.  Patient work-up in ER revealed       PAST MEDICAL HISTORY            Diagnosis Date Noted   • Hyperlipidemia     • Gout     • Anemia     • Hypertension     • Coronary artery disease     • CKD (chronic kidney disease) stage 4, GFR 15-29 ml/min ([facility])     • Respiratory insufficiency     • Renal transplant recipient     • History of echocardiogram     • History of nuclear stress test     • History of cardiac cath     • History of coronary artery bypass graft 03/05/2021   • Moderate tricuspid insufficiency 03/05/2021   • Trace aortic valve regurgitation 03/05/2021   • Type 2 diabetes mellitus with circulatory disorder, with long-term current use of insulin ([facility]) 03/05/2021   • Obstructive sleep apnea on CPAP 03/05/2021   • Coronary artery disease involving native coronary artery of native heart without angina pectoris 04/13/2021   • Positive cardiac stress test 04/13/2021      PAST SURGICAL HISTORY              Procedure Laterality Date   • AMPUTATION         LEFT 3RD DIGIT   • CARDIAC CATHETERIZATION N/A 4/27/2021     Procedure: Left Heart Cath;  Surgeon: Denis Montejo MD;  Location: [facility] CATH INVASIVE LOCATION;  Service: Cardiology;  Laterality: N/A;   • CARDIAC CATHETERIZATION N/A 4/27/2021     Procedure: Coronary angiography;  Surgeon: Denis Montejo MD;  Location: [facility] CATH INVASIVE LOCATION;  Service: Cardiology;  Laterality:  "N/A;   • CARDIAC CATHETERIZATION   4/27/2021     Procedure: Saphenous Vein Graft;  Surgeon: Denis Montejo MD;  Location: [facility] CATH INVASIVE LOCATION;  Service: Cardiology;;   • CARPAL TUNNEL RELEASE Bilateral     • CORONARY ARTERY BYPASS GRAFT   12/15/2011     LIMA to LAD, SVG to OM1 and RCA. By Dr Ortiz   • KNEE ARTHROSCOPY W/ MENISCAL REPAIR Right     • TRANSPLANTATION RENAL   02/05/2014         FAMILY HISTORY           Problem Relation Age of Onset   • Cancer Mother     • Heart disease Father     • Hypertension Father     • Alcohol abuse Brother     • Hyperlipidemia Brother        SOCIAL HISTORY                Socioeconomic History   • Marital status:    Tobacco Use   • Smoking status: Never Smoker   • Smokeless tobacco: Never Used   Substance and Sexual Activity   • Drug use: Never   • Sexual activity: Defer               ALLERGIES  Cephalexin  Home medications reviewed     REVIEW OF SYSTEMS  All systems reviewed and negative except for those discussed in HPI.     PHYSICAL EXAM  Blood pressure 141/83, pulse 63, temperature 96.5 °F (35.8 °C), temperature source Tympanic, resp. rate 18, height 175.3 cm (69\"), SpO2 95 %.    GENERAL: Not distressed  HENT: nares patent  EYES: no scleral icterus  CV: regular rhythm, regular rate  RESPIRATORY: normal effort  ABDOMEN: soft nontender nondistended bowel sounds positive  MUSCULOSKELETAL: no deformity  NEURO: alert, moves all extremities, follows commands  SKIN: warm, dry     LAB RESULTS  Lab Results (last 24 hours)     Procedure Component Value Units Date/Time    Urinalysis With Microscopic If Indicated (No Culture) - Urine, Clean Catch [[id number]]  (Abnormal) Collected: 01/10/22 0813    Specimen: Urine, Clean Catch Updated: 01/10/22 0853     Color, UA Yellow     Appearance, UA Clear     pH, UA <=5.0     Specific Gravity, UA 1.012     Glucose, UA Negative     Ketones, UA Negative     Bilirubin, UA Negative     Blood, UA Negative     Protein, UA 30 " mg/dL (1+)     Leuk Esterase, UA Negative     Nitrite, UA Negative     Urobilinogen, UA 0.2 E.U./dL    Urinalysis, Microscopic Only - Urine, Clean Catch [[id number]]  (Abnormal) Collected: 01/10/22 0813    Specimen: Urine, Clean Catch Updated: 01/10/22 0853     RBC, UA None Seen /HPF      WBC, UA 0-2 /HPF      Bacteria, UA Trace /HPF      Squamous Epithelial Cells, UA None Seen /HPF      Yeast, UA Small/1+ Budding Yeast /HPF      Hyaline Casts, UA 0-2 /LPF      Granular Casts, UA 0-2 /LPF      Methodology Manual Light Microscopy    COVID PRE-OP / PRE-PROCEDURE SCREENING ORDER (NO ISOLATION) - Swab, Nasopharynx [[id number]]  (Normal) Collected: 01/10/22 0733    Specimen: Swab from Nasopharynx Updated: 01/10/22 0838    Narrative:      The following orders were created for panel order COVID PRE-OP / PRE-PROCEDURE SCREENING ORDER (NO ISOLATION) - Swab, Nasopharynx.  Procedure                               Abnormality         Status                     ---------                               -----------         ------                     COVID-19,[facility] IN-HOUSE...[[id number]]  Normal              Final result                 Please view results for these tests on the individual orders.    COVID-19,[facility] IN-HOUSE CEPHEID/ELOISA NP SWAB IN TRANSPORT MEDIA 8-12 HR TAT - Swab, Nasopharynx [[id number]]  (Normal) Collected: 01/10/22 0733    Specimen: Swab from Nasopharynx Updated: 01/10/22 0838     COVID19 Not Detected    Narrative:      Fact sheet for providers: https://www.fda.gov/media/443549/download     Fact sheet for patients: https://www.fda.gov/media/783728/download    Comprehensive Metabolic Panel [[id number]]  (Abnormal) Collected: 01/10/22 0733    Specimen: Blood Updated: 01/10/22 0802     Glucose 157 mg/dL       mg/dL      Creatinine 6.13 mg/dL      Sodium 140 mmol/L      Potassium 4.2 mmol/L      Chloride 101 mmol/L      CO2 20.5 mmol/L      Calcium 9.1 mg/dL      Total Protein 6.8 g/dL      Albumin 4.00 g/dL       ALT (SGPT) 20 U/L      AST (SGOT) 25 U/L      Alkaline Phosphatase 110 U/L      Total Bilirubin 0.3 mg/dL      eGFR Non African Amer 9 mL/min/1.73      Comment: <15 Indicative of kidney failure.        eGFR   Amer --     Comment: <15 Indicative of kidney failure.        Globulin 2.8 gm/dL      A/G Ratio 1.4 g/dL      BUN/Creatinine Ratio 16.5     Anion Gap 18.5 mmol/L     Narrative:      GFR Normal >60  Chronic Kidney Disease <60  Kidney Failure <15      CBC & Differential [[id number]]  (Abnormal) Collected: 01/10/22 0733    Specimen: Blood Updated: 01/10/22 0759    Narrative:      The following orders were created for panel order CBC & Differential.  Procedure                               Abnormality         Status                     ---------                               -----------         ------                     CBC Auto Differential[[id number]]        Abnormal            Final result                 Please view results for these tests on the individual orders.    CBC Auto Differential [[id number]]  (Abnormal) Collected: 01/10/22 0733    Specimen: Blood Updated: 01/10/22 0759     WBC 7.57 10*3/mm3      RBC 3.39 10*6/mm3      Hemoglobin 10.3 g/dL      Hematocrit 33.0 %      MCV 97.3 fL      MCH 30.4 pg      MCHC 31.2 g/dL      RDW 13.4 %      RDW-SD 48.1 fl      MPV 10.6 fL      Platelets 268 10*3/mm3      Neutrophil % 74.0 %      Lymphocyte % 10.4 %      Monocyte % 13.5 %      Eosinophil % 0.9 %      Basophil % 0.7 %      Immature Grans % 0.5 %      Neutrophils, Absolute 5.60 10*3/mm3      Lymphocytes, Absolute 0.79 10*3/mm3      Monocytes, Absolute 1.02 10*3/mm3      Eosinophils, Absolute 0.07 10*3/mm3      Basophils, Absolute 0.05 10*3/mm3      Immature Grans, Absolute 0.04 10*3/mm3      nRBC 0.0 /100 WBC         Imaging Results (Last 24 Hours)     Procedure Component Value Units Date/Time    XR Chest 1 View [[id number]] Collected: 01/10/22 0751     Updated: 01/10/22 0754    Narrative:       PORTABLE CHEST X-RAY     HISTORY: Shortness of breath.     TECHNIQUE: Portable chest x-ray is provided. No previous imaging for  correlation.     FINDINGS: There are sternal wires and cardiomegaly. Vascular volume is  normal. The lungs are clear and the costophrenic sulci are dry.       Impression:      Evidence of previous sternotomy with cardiomegaly but no  acute appearing cardiopulmonary abnormality.     This report was finalized on 1/10/2022 7:51 AM by Dr. Kvng Elliott M.D.           ECG 12 Lead  Component   Ref Range & Units 1/10/22 0742   QT Interval   ms 505              HEART RATE= 60  bpm  RR Interval= 992  ms  RI Interval= 189  ms  P Horizontal Axis=   deg  P Front Axis= 70  deg  QRSD Interval= 112  ms  QT Interval= 505  ms  QRS Axis= 28  deg  T Wave Axis= -7  deg  - ABNORMAL ECG -  Sinus rhythm  Incomplete left bundle branch block  ST depression, consider ischemia, lateral lds  Prolonged QT interval  Poor quality tracing repeat             Current Facility-Administered Medications:   •  allopurinol (ZYLOPRIM) tablet 100 mg, 100 mg, Oral, BID, Sohail Padron MD  •  amLODIPine (NORVASC) tablet 5 mg, 5 mg, Oral, Q24H, Sohail Padron MD  •  aspirin EC tablet 81 mg, 81 mg, Oral, Daily, Sohail Padron MD  •  [START ON 1/11/2022] calcitriol (ROCALTROL) capsule 0.25 mcg, 0.25 mcg, Oral, 2 times per day every other day, Sohail Padron MD  •  cloNIDine (CATAPRES) tablet 0.1 mg, 0.1 mg, Oral, TID, Sohail Padron MD  •  clopidogrel (PLAVIX) tablet 75 mg, 75 mg, Oral, Daily, Sohail Padron MD  •  ferrous sulfate tablet 325 mg, 325 mg, Oral, Daily With Breakfast, Sohail Padron MD  •  furosemide (LASIX) tablet 80 mg, 80 mg, Oral, BID, Sohail Padron MD  •  hydrALAZINE (APRESOLINE) tablet 100 mg, 100 mg, Oral, TID, Sohail Padron MD  •  insulin glargine (LANTUS, SEMGLEE) injection 15 Units, 15 Units, Subcutaneous, Nightly, Sohail Padron MD  •  insulin lispro (ADMELOG) injection 0-7 Units, 0-7 Units, Subcutaneous, TID AC  Sohail Padron MD  •  insulin lispro (ADMELOG) injection 5 Units, 5 Units, Subcutaneous, TID With Meals, Sohail Padron MD  •  levothyroxine (SYNTHROID, LEVOTHROID) tablet 50 mcg, 50 mcg, Oral, Daily, Sohail Padron MD  •  metoprolol tartrate (LOPRESSOR) tablet 25 mg, 25 mg, Oral, Q12H, Sohail Padron MD  •  predniSONE (DELTASONE) tablet 5 mg, 5 mg, Oral, Daily, Sohail Padron MD  •  rosuvastatin (CRESTOR) tablet 20 mg, 20 mg, Oral, Daily, Sohail Pardon MD  •  [COMPLETED] Insert peripheral IV, , , Once **AND** sodium chloride 0.9 % flush 10 mL, 10 mL, Intravenous, PRN, Rodger Pascal MD  •  tacrolimus (PROGRAF) capsule 1 mg, 1 mg, Oral, BID, Sohail Padron MD    Current Outpatient Medications:   •  allopurinol (ZYLOPRIM) 100 MG tablet, Take 100 mg by mouth 2 (Two) Times a Day., Disp: , Rfl:   •  amLODIPine (NORVASC) 2.5 MG tablet, Take 2.5 mg by mouth 2 (Two) Times a Day., Disp: 90 tablet, Rfl: 3  •  aspirin 81 MG EC tablet, Take 81 mg by mouth Daily., Disp: , Rfl:   •  calcitriol (ROCALTROL) 0.25 MCG capsule, Take 0.25 mcg by mouth See Admin Instructions. Takes .25mg 2 times daily, every other day., Disp: , Rfl:   •  cloNIDine (CATAPRES) 0.1 MG tablet, Take 0.1 mg by mouth 3 (Three) Times a Day., Disp: , Rfl:   •  clopidogrel (PLAVIX) 75 MG tablet, Take 75 mg by mouth Daily., Disp: , Rfl:   •  everolimus (ZORTRESS) 0.75 MG tablet, Take 0.75 mg by mouth 2 (Two) Times a Day., Disp: , Rfl:   •  ferrous sulfate (FeroSul) 220 (44 Fe) MG/5ML solution, Take 5 mL by mouth Daily., Disp: , Rfl:   •  furosemide (LASIX) 80 MG tablet, Take 80 mg by mouth 2 (Two) Times a Day., Disp: , Rfl:   •  hydrALAZINE (APRESOLINE) 100 MG tablet, Take 100 mg by mouth 3 (Three) Times a Day., Disp: , Rfl:   •  insulin aspart (novoLOG) 100 UNIT/ML injection, Inject 6 Units under the skin into the appropriate area as directed 3 (Three) Times a Day Before Meals., Disp: , Rfl:   •  insulin detemir (LEVEMIR) 100 UNIT/ML injection, Inject 9 Units under  the skin into the appropriate area as directed Every Night., Disp: , Rfl:   •  levothyroxine (SYNTHROID, LEVOTHROID) 50 MCG tablet, Take 50 mcg by mouth Daily., Disp: , Rfl:   •  metoprolol tartrate (LOPRESSOR) 25 MG tablet, Take 25 mg by mouth 2 (Two) Times a Day., Disp: , Rfl:   •  Multiple Vitamins-Minerals (MULTIVITAMIN ADULT PO), Take 1 tablet by mouth Daily., Disp: , Rfl:   •  predniSONE (DELTASONE) 5 MG tablet, Take 5 mg by mouth Daily., Disp: , Rfl:   •  rosuvastatin (CRESTOR) 20 MG tablet, Take 20 mg by mouth Daily., Disp: , Rfl:   •  tacrolimus (PROGRAF) 1 MG capsule, Take 1 mg by mouth 2 (Two) Times a Day., Disp: , Rfl:     ASSESSMENT  Acute kidney injury with history of chronic kidney disease with uremic symptoms  Status post kidney transplant 2014  Insulin-dependent diabetes mellitus  Hypertension  Hyperlipidemia  Hypothyroidism  Chronic anemia  Gastroesophageal reflux disease    PLAN  Admit  Start peritoneal dialysis after PD catheter placement  Nephrology consult  Surgery to place peritoneal dialysis catheter  Continue home medications  Stress ulcer DVT prophylaxis  Supportive care  Patient is full code  Discussed with wife nursing staff and nephrology  Follow closely and further recommendation according to hospital course    OSMANY SOLANO MD

## 2022-01-10 NOTE — PROGRESS NOTES
Clinical Pharmacy Services: Medication History    Dilshad Cee is a 66 y.o. male presenting to [facility] for   Chief Complaint   Patient presents with   • Shortness of Breath       He  has a past medical history of Anemia, Chronic kidney disease, Coronary artery disease, Gout, History of cardiac cath, History of echocardiogram, History of nuclear stress test, Hyperlipidemia, Hypertension, Renal transplant recipient, and Respiratory insufficiency.    Allergies as of 01/10/2022 - Reviewed 01/10/2022   Allergen Reaction Noted   • Cephalexin Unknown (See Comments) and Rash 12/08/2014       Medication information was obtained from: Patient   Pharmacy and Phone Number:     Prior to Admission Medications     Prescriptions Last Dose Informant Patient Reported? Taking?    allopurinol (ZYLOPRIM) 100 MG tablet 1/9/2022 Self Yes Yes    Take 100 mg by mouth 2 (Two) Times a Day.    amLODIPine (NORVASC) 2.5 MG tablet 1/9/2022 Self Yes Yes    Take 2.5 mg by mouth 2 (Two) Times a Day.    aspirin 81 MG EC tablet 1/9/2022 Self Yes Yes    Take 81 mg by mouth Daily.    calcitriol (ROCALTROL) 0.25 MCG capsule 1/9/2022 Self Yes Yes    Take 0.25 mcg by mouth See Admin Instructions. Takes .25mg 2 times daily, every other day.    cloNIDine (CATAPRES) 0.1 MG tablet 1/9/2022 Self Yes Yes    Take 0.1 mg by mouth 3 (Three) Times a Day.    clopidogrel (PLAVIX) 75 MG tablet 1/9/2022 Self Yes Yes    Take 75 mg by mouth Daily.    everolimus (ZORTRESS) 0.75 MG tablet 1/9/2022 Self Yes Yes    Take 0.75 mg by mouth 2 (Two) Times a Day.    ferrous sulfate (FeroSul) 220 (44 Fe) MG/5ML solution 1/9/2022 Self Yes Yes    Take 5 mL by mouth Daily.    furosemide (LASIX) 80 MG tablet 1/9/2022 Self Yes Yes    Take 80 mg by mouth 2 (Two) Times a Day.    hydrALAZINE (APRESOLINE) 100 MG tablet 1/9/2022 Self Yes Yes    Take 100 mg by mouth 3 (Three) Times a Day.    insulin aspart (novoLOG) 100 UNIT/ML injection 1/9/2022 Self Yes Yes    Inject  6 Units under the skin into the appropriate area as directed 3 (Three) Times a Day Before Meals.    insulin detemir (LEVEMIR) 100 UNIT/ML injection 1/9/2022 Self Yes Yes    Inject 9 Units under the skin into the appropriate area as directed Every Night.    levothyroxine (SYNTHROID, LEVOTHROID) 50 MCG tablet 1/9/2022 Self Yes Yes    Take 50 mcg by mouth Daily.    metoprolol tartrate (LOPRESSOR) 25 MG tablet 1/9/2022 Self Yes Yes    Take 25 mg by mouth 2 (Two) Times a Day.    Multiple Vitamins-Minerals (MULTIVITAMIN ADULT PO) 1/9/2022 Self Yes Yes    Take 1 tablet by mouth Daily.    predniSONE (DELTASONE) 5 MG tablet 1/9/2022 Self Yes Yes    Take 5 mg by mouth Daily.    rosuvastatin (CRESTOR) 20 MG tablet 1/9/2022 Self Yes Yes    Take 20 mg by mouth Daily.    tacrolimus (PROGRAF) 1 MG capsule 1/9/2022 Self Yes Yes    Take 1 mg by mouth 2 (Two) Times a Day.            Medication notes:     This medication list is complete to the best of my knowledge as of 1/10/2022    Please call if questions.    Jr Conti  Medication History Technician  [phone]    1/10/2022 09:54 EST

## 2022-01-10 NOTE — CONSULTS
Chief Complaint   Patient presents with   • Shortness of Breath       Subjective      Dilshad Cee is a 66 y.o. male who is referred by Dr. Evans to be evaluated for peritoneal dialysis catheter placement. Patient has ESRD secondary to IgA nephropathy status post kidney transplant that has been failing for the past several years.  He has been started on dialysis. Patient does have a AV access.  Patient has not had a recent intraabdominal infection. The patient reports having constipation  Patient did have a Colonoscopy.   He reports having an umbilical hernia.  He is asymptomatic from it    Home Evaluation: YES/NO [08138]    Past Medical History:   Diagnosis Date   • Anemia    • Chronic kidney disease    • Coronary artery disease    • Gout    • History of cardiac cath     12/5/2011 - Revealed right atrial mean of 7. RV 35/8. PA 33/15. PA mean 19. Wedge 14. Cardiac index 2.71. Left main distal 20%. LAD proximal 90%, mid diaginal ostial 80%. Circumflex small but normal. RCA dominant proximal 60%.   • History of echocardiogram    • History of nuclear stress test     3/14/2017 Small fixed inferior wall defect, suggestive of attenuation. Mild septal hypokinesis. EF 58%. 10/11/2011 Reveals LVEF 59%, possible distal apical wall ischemia.   • Hyperlipidemia    • Hypertension    • Renal transplant recipient    • Respiratory insufficiency        Past Surgical History:   Procedure Laterality Date   • AMPUTATION      LEFT 3RD DIGIT   • CARDIAC CATHETERIZATION N/A 4/27/2021    Procedure: Left Heart Cath;  Surgeon: Denis Montejo MD;  Location:  [facility] CATH INVASIVE LOCATION;  Service: Cardiology;  Laterality: N/A;   • CARDIAC CATHETERIZATION N/A 4/27/2021    Procedure: Coronary angiography;  Surgeon: Denis Montejo MD;  Location:  [facility] CATH [facility]VAS[facility] LOC[facility];  Service: Cardiology;  Laterality: N/A;   • CARDIAC CATHETERIZATION  4/27/2021    Procedure: Saphenous Vein Graft;  Surgeon: Nikia  Denis Aguirre MD;  Location: [facility] CATH INVASIVE LOCATION;  Service: Cardiology;;   • CARPAL TUNNEL RELEASE Bilateral    • CORONARY ARTERY BYPASS GRAFT  12/15/2011    LIMA to LAD, SVG to OM1 and RCA. By Dr Ortiz   • KNEE ARTHROSCOPY W/ MENISCAL REPAIR Right    • TRANSPLANTATION RENAL  02/05/2014         Current Facility-Administered Medications:   •  allopurinol (ZYLOPRIM) tablet 100 mg, 100 mg, Oral, BID, Sohail Padron MD  •  amLODIPine (NORVASC) tablet 5 mg, 5 mg, Oral, Q24H, Sohail Padron MD  •  aspirin EC tablet 81 mg, 81 mg, Oral, Daily, Sohail Padron MD  •  [START ON 1/11/2022] calcitriol (ROCALTROL) capsule 0.25 mcg, 0.25 mcg, Oral, 2 times per day every other day, Sohail Padron MD  •  cloNIDine (CATAPRES) tablet 0.1 mg, 0.1 mg, Oral, TID, Sohail Padron MD  •  clopidogrel (PLAVIX) tablet 75 mg, 75 mg, Oral, Daily, Sohail Padron MD  •  famotidine (PEPCID) tablet 20 mg, 20 mg, Oral, BID, Sohail Padron MD  •  ferrous sulfate tablet 325 mg, 325 mg, Oral, Daily With Breakfast, Sohail Padron MD  •  furosemide (LASIX) tablet 80 mg, 80 mg, Oral, BID, Sohail Padron MD  •  hydrALAZINE (APRESOLINE) tablet 100 mg, 100 mg, Oral, TID, Sohail Padron MD  •  insulin glargine (LANTUS, SEMGLEE) injection 15 Units, 15 Units, Subcutaneous, Nightly, Sohail Padron MD  •  insulin lispro (ADMELOG) injection 0-7 Units, 0-7 Units, Subcutaneous, TID AC, Sohail Padron MD  •  insulin lispro (ADMELOG) injection 5 Units, 5 Units, Subcutaneous, TID With Meals, Sohail Padron MD  •  levothyroxine (SYNTHROID, LEVOTHROID) tablet 50 mcg, 50 mcg, Oral, Daily, Sohail Padron MD  •  metoprolol tartrate (LOPRESSOR) tablet 25 mg, 25 mg, Oral, Q12H, Sohail Padron MD  •  predniSONE (DELTASONE) tablet 5 mg, 5 mg, Oral, Daily, Sohail Padron MD  •  rosuvastatin (CRESTOR) tablet 20 mg, 20 mg, Oral, Daily, Sohail Padron MD  •  [COMPLETED] Insert peripheral IV, , , Once **AND** sodium chloride 0.9 % flush 10 mL, 10 mL, Intravenous, PRN, Rodger Pascal  MD  •  tacrolimus (PROGRAF) capsule 1 mg, 1 mg, Oral, BID, Sohail Padron MD    Current Outpatient Medications:   •  allopurinol (ZYLOPRIM) 100 MG tablet, Take 100 mg by mouth 2 (Two) Times a Day., Disp: , Rfl:   •  amLODIPine (NORVASC) 2.5 MG tablet, Take 2.5 mg by mouth 2 (Two) Times a Day., Disp: 90 tablet, Rfl: 3  •  aspirin 81 MG EC tablet, Take 81 mg by mouth Daily., Disp: , Rfl:   •  calcitriol (ROCALTROL) 0.25 MCG capsule, Take 0.25 mcg by mouth See Admin Instructions. Takes .25mg 2 times daily, every other day., Disp: , Rfl:   •  cloNIDine (CATAPRES) 0.1 MG tablet, Take 0.1 mg by mouth 3 (Three) Times a Day., Disp: , Rfl:   •  clopidogrel (PLAVIX) 75 MG tablet, Take 75 mg by mouth Daily., Disp: , Rfl:   •  everolimus (ZORTRESS) 0.75 MG tablet, Take 0.75 mg by mouth 2 (Two) Times a Day., Disp: , Rfl:   •  ferrous sulfate (FeroSul) 220 (44 Fe) MG/5ML solution, Take 5 mL by mouth Daily., Disp: , Rfl:   •  furosemide (LASIX) 80 MG tablet, Take 80 mg by mouth 2 (Two) Times a Day., Disp: , Rfl:   •  hydrALAZINE (APRESOLINE) 100 MG tablet, Take 100 mg by mouth 3 (Three) Times a Day., Disp: , Rfl:   •  insulin aspart (novoLOG) 100 UNIT/ML injection, Inject 6 Units under the skin into the appropriate area as directed 3 (Three) Times a Day Before Meals., Disp: , Rfl:   •  insulin detemir (LEVEMIR) 100 UNIT/ML injection, Inject 9 Units under the skin into the appropriate area as directed Every Night., Disp: , Rfl:   •  levothyroxine (SYNTHROID, LEVOTHROID) 50 MCG tablet, Take 50 mcg by mouth Daily., Disp: , Rfl:   •  metoprolol tartrate (LOPRESSOR) 25 MG tablet, Take 25 mg by mouth 2 (Two) Times a Day., Disp: , Rfl:   •  Multiple Vitamins-Minerals (MULTIVITAMIN ADULT PO), Take 1 tablet by mouth Daily., Disp: , Rfl:   •  predniSONE (DELTASONE) 5 MG tablet, Take 5 mg by mouth Daily., Disp: , Rfl:   •  rosuvastatin (CRESTOR) 20 MG tablet, Take 20 mg by mouth Daily., Disp: , Rfl:   •  tacrolimus (PROGRAF) 1 MG capsule,  "Take 1 mg by mouth 2 (Two) Times a Day., Disp: , Rfl:     Allergies   Allergen Reactions   • Cephalexin Unknown (See Comments) and Rash     .       Family History   Problem Relation Age of Onset   • Cancer Mother    • Heart disease Father    • Hypertension Father    • Alcohol abuse Brother    • Hyperlipidemia Brother        Social History     Socioeconomic History   • Marital status:    Tobacco Use   • Smoking status: Never Smoker   • Smokeless tobacco: Never Used   Substance and Sexual Activity   • Drug use: Never   • Sexual activity: Defer     REVIEW OF SYSTEMS    Review of Systems   Constitutional: Positive for fatigue. Negative for activity change.   HENT: Negative for congestion and nosebleeds.    Eyes: Negative for discharge and itching.   Respiratory: Positive for chest tightness and shortness of breath. Negative for apnea, cough and choking.    Cardiovascular: Negative for chest pain and leg swelling.   Gastrointestinal: Negative for abdominal distention, abdominal pain, anal bleeding, blood in stool and constipation.   Endocrine: Negative for cold intolerance and heat intolerance.   Genitourinary: Negative for difficulty urinating and hematuria.   Musculoskeletal: Negative for arthralgias and gait problem.   Skin: Negative for color change and pallor.   Allergic/Immunologic: Negative for environmental allergies and food allergies.   Neurological: Negative for dizziness and light-headedness.   Hematological: Negative for adenopathy. Does not bruise/bleed easily.   Psychiatric/Behavioral: Negative for agitation and confusion.       Physical Examination  /83   Pulse 63   Temp 96.5 °F (35.8 °C) (Tympanic)   Resp 18   Ht 175.3 cm (69\")   SpO2 95%   BMI 31.01 kg/m²   Body mass index is 31.01 kg/m².  Physical Exam  Constitutional:       Appearance: Normal appearance.   HENT:      Head: Normocephalic and atraumatic.      Nose: Nose normal.      Mouth/Throat:      Pharynx: Oropharynx is clear. "   Eyes:      General: No scleral icterus.     Conjunctiva/sclera: Conjunctivae normal.   Cardiovascular:      Rate and Rhythm: Normal rate and regular rhythm.      Pulses: Normal pulses.      Heart sounds: Normal heart sounds.   Pulmonary:      Effort: Pulmonary effort is normal.      Breath sounds: Normal breath sounds.   Abdominal:      General: Bowel sounds are normal. There is no distension.      Palpations: Abdomen is soft.      Tenderness: There is no abdominal tenderness.      Hernia: A hernia is present.      Comments: There is a small easily reducible umbilical hernia.  None tenderness  I was not able to examine his inguinal canal since he has been laying flat for hemodialysis  Well-healed right lower quadrant incision from prior kidney transplantation   Musculoskeletal:         General: Normal range of motion.      Cervical back: Normal range of motion and neck supple.   Skin:     General: Skin is warm and dry.      Capillary Refill: Capillary refill takes less than 2 seconds.   Neurological:      General: No focal deficit present.      Mental Status: He is alert. Mental status is at baseline.   Psychiatric:         Mood and Affect: Mood normal.         Behavior: Behavior normal.       Labs: White blood cell count 7.5, hemoglobin 10.3, platelets 268  Glucose 157, creatinine 6.1,   All other labs stable    Assessment:   Dilshad Cee is a 66 y.o. male with end-stage renal disease secondary to IgA nephropathy s/p kidney transplantation that has been failing.  He was started today on hemodialysis through a right arm AV fistula.  I discussed with him about peritoneal dialysis catheter as well as the need to have umbilical hernia repair at the same time.  I had not been able to assess his groin for inguinal hernias and this will be done before plans for surgery are in place.  I think he will be a good candidate for peritoneal dialysis catheter placement and umbilical hernia repair with mesh  placement.  He will need to be off Plavix for 7 days prior to the procedure.  He has constipation and I think he should be on a bowel regimen      Plan:     -Patient to follow-up in my office in 2 weeks for reevaluation and plan for umbilical hernia repair peritoneal dialysis catheter placement  -Start stool softeners 2 tablets daily  -We will see the patient as needed.  No need for any type of acute surgical intervention      Dameon Portillo MD  General, Minimally Invasive and Endoscopic Surgery  [facility]    [address], [address]  [place], [place], [address]  P: [phone]  F: [phone]

## 2022-01-11 LAB
ALBUMIN SERPL-MCNC: 3.7 G/DL (ref 3.5–5.2)
ALBUMIN/GLOB SERPL: 1.4 G/DL
ALP SERPL-CCNC: 101 U/L (ref 39–117)
ALT SERPL W P-5'-P-CCNC: 20 U/L (ref 1–41)
ANION GAP SERPL CALCULATED.3IONS-SCNC: 13.5 MMOL/L (ref 5–15)
AST SERPL-CCNC: 26 U/L (ref 1–40)
BILIRUB SERPL-MCNC: 0.3 MG/DL (ref 0–1.2)
BUN SERPL-MCNC: 60 MG/DL (ref 8–23)
BUN/CREAT SERPL: 13 (ref 7–25)
BURR CELLS BLD QL SMEAR: ABNORMAL
CALCIUM SPEC-SCNC: 8.8 MG/DL (ref 8.6–10.5)
CHLORIDE SERPL-SCNC: 102 MMOL/L (ref 98–107)
CHOLEST SERPL-MCNC: 156 MG/DL (ref 0–200)
CO2 SERPL-SCNC: 22.5 MMOL/L (ref 22–29)
CREAT SERPL-MCNC: 4.62 MG/DL (ref 0.76–1.27)
DEPRECATED RDW RBC AUTO: 45.2 FL (ref 37–54)
EOSINOPHIL # BLD MANUAL: 0.08 10*3/MM3 (ref 0–0.4)
EOSINOPHIL NFR BLD MANUAL: 1 % (ref 0.3–6.2)
ERYTHROCYTE [DISTWIDTH] IN BLOOD BY AUTOMATED COUNT: 13.1 % (ref 12.3–15.4)
FERRITIN SERPL-MCNC: 125 NG/ML (ref 30–400)
GFR SERPL CREATININE-BSD FRML MDRD: 13 ML/MIN/1.73
GFR SERPL CREATININE-BSD FRML MDRD: ABNORMAL ML/MIN/{1.73_M2}
GLOBULIN UR ELPH-MCNC: 2.6 GM/DL
GLUCOSE BLDC GLUCOMTR-MCNC: 106 MG/DL (ref 70–130)
GLUCOSE BLDC GLUCOMTR-MCNC: 152 MG/DL (ref 70–130)
GLUCOSE BLDC GLUCOMTR-MCNC: 75 MG/DL (ref 70–130)
GLUCOSE BLDC GLUCOMTR-MCNC: 80 MG/DL (ref 70–130)
GLUCOSE SERPL-MCNC: 83 MG/DL (ref 65–99)
HBA1C MFR BLD: 6.5 % (ref 4.8–5.6)
HCT VFR BLD AUTO: 30.4 % (ref 37.5–51)
HDLC SERPL-MCNC: 35 MG/DL (ref 40–60)
HGB BLD-MCNC: 9.8 G/DL (ref 13–17.7)
IRON 24H UR-MRATE: 43 MCG/DL (ref 59–158)
IRON SATN MFR SERPL: 13 % (ref 20–50)
LDLC SERPL CALC-MCNC: 91 MG/DL (ref 0–100)
LDLC/HDLC SERPL: 2.46 {RATIO}
LYMPHOCYTES # BLD MANUAL: 0.16 10*3/MM3 (ref 0.7–3.1)
LYMPHOCYTES NFR BLD MANUAL: 5.2 % (ref 5–12)
MCH RBC QN AUTO: 30.6 PG (ref 26.6–33)
MCHC RBC AUTO-ENTMCNC: 32.2 G/DL (ref 31.5–35.7)
MCV RBC AUTO: 95 FL (ref 79–97)
MONOCYTES # BLD: 0.4 10*3/MM3 (ref 0.1–0.9)
NEUTROPHILS # BLD AUTO: 6.98 10*3/MM3 (ref 1.7–7)
NEUTROPHILS NFR BLD MANUAL: 91.8 % (ref 42.7–76)
NT-PROBNP SERPL-MCNC: ABNORMAL PG/ML (ref 0–900)
PLAT MORPH BLD: NORMAL
PLATELET # BLD AUTO: 233 10*3/MM3 (ref 140–450)
PMV BLD AUTO: 10.7 FL (ref 6–12)
POTASSIUM SERPL-SCNC: 3.8 MMOL/L (ref 3.5–5.2)
PROT SERPL-MCNC: 6.3 G/DL (ref 6–8.5)
RBC # BLD AUTO: 3.2 10*6/MM3 (ref 4.14–5.8)
SODIUM SERPL-SCNC: 138 MMOL/L (ref 136–145)
TIBC SERPL-MCNC: 335 MCG/DL (ref 298–536)
TRANSFERRIN SERPL-MCNC: 225 MG/DL (ref 200–360)
TRIGL SERPL-MCNC: 175 MG/DL (ref 0–150)
TSH SERPL DL<=0.05 MIU/L-ACNC: 7.37 UIU/ML (ref 0.27–4.2)
VARIANT LYMPHS NFR BLD MANUAL: 2.1 % (ref 19.6–45.3)
VLDLC SERPL-MCNC: 30 MG/DL (ref 5–40)
WBC MORPH BLD: NORMAL
WBC NRBC COR # BLD: 7.6 10*3/MM3 (ref 3.4–10.8)

## 2022-01-11 PROCEDURE — 83036 HEMOGLOBIN GLYCOSYLATED A1C: CPT | Performed by: HOSPITALIST

## 2022-01-11 PROCEDURE — 63710000001 INSULIN LISPRO (HUMAN) PER 5 UNITS: Performed by: HOSPITALIST

## 2022-01-11 PROCEDURE — 83540 ASSAY OF IRON: CPT | Performed by: INTERNAL MEDICINE

## 2022-01-11 PROCEDURE — 82962 GLUCOSE BLOOD TEST: CPT

## 2022-01-11 PROCEDURE — 85025 COMPLETE CBC W/AUTO DIFF WBC: CPT | Performed by: HOSPITALIST

## 2022-01-11 PROCEDURE — 80061 LIPID PANEL: CPT | Performed by: HOSPITALIST

## 2022-01-11 PROCEDURE — 80053 COMPREHEN METABOLIC PANEL: CPT | Performed by: HOSPITALIST

## 2022-01-11 PROCEDURE — 63710000001 PREDNISONE PER 5 MG: Performed by: HOSPITALIST

## 2022-01-11 PROCEDURE — 84466 ASSAY OF TRANSFERRIN: CPT | Performed by: INTERNAL MEDICINE

## 2022-01-11 PROCEDURE — 84443 ASSAY THYROID STIM HORMONE: CPT | Performed by: HOSPITALIST

## 2022-01-11 PROCEDURE — 82728 ASSAY OF FERRITIN: CPT | Performed by: INTERNAL MEDICINE

## 2022-01-11 PROCEDURE — 85007 BL SMEAR W/DIFF WBC COUNT: CPT | Performed by: HOSPITALIST

## 2022-01-11 PROCEDURE — 63710000001 TACROLIMUS PER 1 MG: Performed by: HOSPITALIST

## 2022-01-11 PROCEDURE — 83880 ASSAY OF NATRIURETIC PEPTIDE: CPT | Performed by: HOSPITALIST

## 2022-01-11 RX ORDER — HYDRALAZINE HYDROCHLORIDE 50 MG/1
100 TABLET, FILM COATED ORAL EVERY 8 HOURS SCHEDULED
Status: DISCONTINUED | OUTPATIENT
Start: 2022-01-11 | End: 2022-01-12

## 2022-01-11 RX ORDER — FAMOTIDINE 20 MG/1
20 TABLET, FILM COATED ORAL DAILY
Status: DISCONTINUED | OUTPATIENT
Start: 2022-01-11 | End: 2022-01-13 | Stop reason: HOSPADM

## 2022-01-11 RX ORDER — CLONIDINE HYDROCHLORIDE 0.1 MG/1
0.1 TABLET ORAL EVERY 12 HOURS SCHEDULED
Status: DISCONTINUED | OUTPATIENT
Start: 2022-01-11 | End: 2022-01-13

## 2022-01-11 RX ORDER — ROSUVASTATIN CALCIUM 10 MG/1
10 TABLET, COATED ORAL DAILY
Status: DISCONTINUED | OUTPATIENT
Start: 2022-01-12 | End: 2022-01-12

## 2022-01-11 RX ADMIN — CALCITRIOL 0.25 MCG: 0.25 CAPSULE ORAL at 22:39

## 2022-01-11 RX ADMIN — CLOPIDOGREL 75 MG: 75 TABLET, FILM COATED ORAL at 14:21

## 2022-01-11 RX ADMIN — FUROSEMIDE 80 MG: 80 TABLET ORAL at 22:39

## 2022-01-11 RX ADMIN — ASPIRIN 81 MG: 81 TABLET, COATED ORAL at 14:20

## 2022-01-11 RX ADMIN — FUROSEMIDE 80 MG: 80 TABLET ORAL at 00:15

## 2022-01-11 RX ADMIN — INSULIN LISPRO 5 UNITS: 100 INJECTION, SOLUTION INTRAVENOUS; SUBCUTANEOUS at 17:24

## 2022-01-11 RX ADMIN — TACROLIMUS 1 MG: 1 CAPSULE ORAL at 22:38

## 2022-01-11 RX ADMIN — FAMOTIDINE 20 MG: 20 TABLET, FILM COATED ORAL at 14:30

## 2022-01-11 RX ADMIN — LEVOTHYROXINE SODIUM 50 MCG: 0.05 TABLET ORAL at 14:21

## 2022-01-11 RX ADMIN — FERROUS SULFATE TAB 325 MG (65 MG ELEMENTAL FE) 325 MG: 325 (65 FE) TAB at 14:21

## 2022-01-11 RX ADMIN — CLONIDINE HYDROCHLORIDE 0.1 MG: 0.1 TABLET ORAL at 22:38

## 2022-01-11 RX ADMIN — METOPROLOL TARTRATE 25 MG: 25 TABLET, FILM COATED ORAL at 22:51

## 2022-01-11 RX ADMIN — METOPROLOL TARTRATE 25 MG: 25 TABLET, FILM COATED ORAL at 14:30

## 2022-01-11 RX ADMIN — INSULIN GLARGINE-YFGN 15 UNITS: 100 INJECTION, SOLUTION SUBCUTANEOUS at 22:42

## 2022-01-11 RX ADMIN — HYDRALAZINE HYDROCHLORIDE 100 MG: 50 TABLET, FILM COATED ORAL at 00:15

## 2022-01-11 RX ADMIN — ALLOPURINOL 100 MG: 100 TABLET ORAL at 14:21

## 2022-01-11 RX ADMIN — SENNOSIDES AND DOCUSATE SODIUM 2 TABLET: 8.6; 5 TABLET ORAL at 22:38

## 2022-01-11 RX ADMIN — HYDRALAZINE HYDROCHLORIDE 100 MG: 50 TABLET, FILM COATED ORAL at 22:38

## 2022-01-11 RX ADMIN — CLONIDINE HYDROCHLORIDE 0.1 MG: 0.1 TABLET ORAL at 00:15

## 2022-01-11 RX ADMIN — HYDRALAZINE HYDROCHLORIDE 100 MG: 50 TABLET, FILM COATED ORAL at 14:36

## 2022-01-11 RX ADMIN — TACROLIMUS 1 MG: 1 CAPSULE ORAL at 00:24

## 2022-01-11 RX ADMIN — FUROSEMIDE 80 MG: 80 TABLET ORAL at 14:21

## 2022-01-11 RX ADMIN — INSULIN LISPRO 2 UNITS: 100 INJECTION, SOLUTION INTRAVENOUS; SUBCUTANEOUS at 17:27

## 2022-01-11 RX ADMIN — TACROLIMUS 1 MG: 1 CAPSULE ORAL at 14:21

## 2022-01-11 RX ADMIN — CALCITRIOL 0.25 MCG: 0.25 CAPSULE ORAL at 14:20

## 2022-01-11 RX ADMIN — PREDNISONE 5 MG: 10 TABLET ORAL at 14:21

## 2022-01-11 RX ADMIN — ALLOPURINOL 100 MG: 100 TABLET ORAL at 22:38

## 2022-01-11 NOTE — CASE MANAGEMENT/SOCIAL WORK
Discharge Planning Assessment  [facility]     Patient Name: Dilshad Cee  MRN: [id number]  Today's Date: 1/11/2022    Admit Date: 1/10/2022     Discharge Needs Assessment     Row Name 01/11/22 0835       Living Environment    Lives With spouse    Name(s) of Who Lives With Patient Spouse   (Kya Cee  [phone])    Current Living Arrangements home/apartment/condo    Primary Care Provided by self    Provides Primary Care For no one    Family Caregiver if Needed spouse    Family Caregiver Names Spouse   (Kya Cee  [phone])    Quality of Family Relationships helpful; involved; supportive    Able to Return to Prior Arrangements yes    Living Arrangement Comments Pt lives in a single story house with his spouse (Kya Cee  [phone]).       Resource/Environmental Concerns    Resource/Environmental Concerns none    Transportation Concerns car, none       Transition Planning    Patient/Family Anticipates Transition to home with family    Transportation Anticipated family or friend will provide; car, drives self       Discharge Needs Assessment    Readmission Within the Last 30 Days no previous admission in last 30 days    Equipment Currently Used at Home bath bench; bp cuff; cpap; glucometer    Concerns to be Addressed denies needs/concerns at this time; no discharge needs identified    Anticipated Changes Related to Illness none    Equipment Needed After Discharge bath bench; bp cuff; cpap; glucometer               Discharge Plan     Row Name 01/11/22 0838       Plan    Plan Plan home with family.   ADINA Sneed RN    Patient/Family in Agreement with Plan yes    Plan Comments FACE SHEET VERIFIED/ IM LETTER SIGNED.  Spoke with pt and pt's spouse Kya at bedside with pt's permission.  Pt's PCP is Dr. Sam Marie.  Pt lives in a single story house with his spouse (Kya Cee  [phone]).  Pt is independent with ADLs.  Pt has a  C PAP, bath bench, B/P cuff, and glucometer for home use.   Pt  gets his prescriptions at [facility]  ([facility]  Pt denies any issues affording medications.  Pt is not current with HH.   Pt has not been in SNF.  Pt denies any discharge needs. CCP to follow dialysis needs.   Plan home with family.   ADINA Sneed RN              Continued Care and Services - Admitted Since 1/10/2022    Coordination has not been started for this encounter.       Expected Discharge Date and Time     Expected Discharge Date Expected Discharge Time    Jan 17, 2022          Demographic Summary     Row Name 01/11/22 0835       General Information    Admission Type inpatient    Arrived From emergency department    Required Notices Provided Important Message from Medicare    Referral Source admission list    Reason for Consult discharge planning    Preferred Language English     Used During This Interaction no               Functional Status     Row Name 01/11/22 0835       Functional Status    Usual Activity Tolerance good    Current Activity Tolerance good       Functional Status, IADL    Medications independent    Meal Preparation independent    Housekeeping assistive person    Laundry assistive person    Shopping assistive person       Mental Status    General Appearance WDL WDL               Psychosocial    No documentation.                Abuse/Neglect    No documentation.                Legal    No documentation.                Substance Abuse    No documentation.                Patient Forms    No documentation.                   Karina Sneed, RN

## 2022-01-11 NOTE — CASE MANAGEMENT/SOCIAL WORK
Continued Stay Note  [facility]     Patient Name: Dilshad Cee  MRN: [id number]  Today's Date: 1/11/2022    Admit Date: 1/10/2022     Discharge Plan     Row Name 01/11/22 0838       Plan    Plan Plan home with family.   ADINA Sneed RN    Patient/Family in Agreement with Plan yes    Plan Comments FACE SHEET VERIFIED/ IM LETTER SIGNED.  Spoke with pt and pt's spouse Kya at bedside with pt's permission.  Pt's PCP is Dr. Sam Marie.  Pt lives in a single story house with his spouse (Kya Cee  [phone]).  Pt is independent with ADLs.  Pt has a  C PAP, bath bench, B/P cuff, and glucometer for home use.   Pt gets his prescriptions at [facility]  ([facility]).  Pt denies any issues affording medications.  Pt is not current with .   Pt has not been in SNF.  Pt denies any discharge needs. CCP to follow dialysis needs.   Plan home with family.   ADINA Sneed RN               Discharge Codes    No documentation.               Expected Discharge Date and Time     Expected Discharge Date Expected Discharge Time    Jan 17, 2022             Karina Sneed RN

## 2022-01-11 NOTE — CASE MANAGEMENT/SOCIAL WORK
Continued Stay Note  [facility]     Patient Name: Dilshad Cee  MRN: [id number]  Today's Date: 1/11/2022    Admit Date: 1/10/2022     Discharge Plan     Row Name 01/11/22 1302       Plan    Plan Plan home with family- and outpatient dialysis at [facility].   ADINA Sneed RN    Patient/Family in Agreement with Plan yes    Plan Comments Pt's information entered into [facility] Portal for outpatient dialysis.   Awaiting dialysis flow sheet to fax to [facility].  Plan home with family- and outpatient dialysis at [facility].   ADINA Sneed RN               Discharge Codes    No documentation.               Expected Discharge Date and Time     Expected Discharge Date Expected Discharge Time    Jan 17, 2022             Karina Sneed RN

## 2022-01-11 NOTE — PROGRESS NOTES
"Daily progress note    Chief complaint  Doing better  No new complaints  Family bedside  Denies chest pain shortness of breath or palpitation    History of present illness  66-year-old very pleasant white male with history of kidney transplant in 2014 other medical problems hypertension hyperlipidemia hypothyroidism diabetes mellitus osteoarthritis gout chronic anemia presented to [facility] [place] emergency room with shortness of breath with generalized weakness no appetite increased fatigue.  Patient denies any fever chills cough chest pain abdominal pain vomiting diarrhea.  Patient does have some nausea.  Patient work-up in ER revealed     REVIEW OF SYSTEMS  All systems reviewed and negative except for those discussed in HPI.     PHYSICAL EXAM  Blood pressure 131/79, pulse 68, temperature 98.4 °F (36.9 °C), temperature source Oral, resp. rate 17, height 175.3 cm (69\"), weight 89.6 kg (197 lb 8 oz), SpO2 95 %.    GENERAL: Not distressed  HENT: nares patent  EYES: no scleral icterus  CV: regular rhythm, regular rate  RESPIRATORY: normal effort  ABDOMEN: soft nontender nondistended bowel sounds positive  MUSCULOSKELETAL: no deformity  NEURO: alert, moves all extremities, follows commands  SKIN: warm, dry     LAB RESULTS  Lab Results (last 24 hours)     Procedure Component Value Units Date/Time    POC Glucose Once [[id number]]  (Normal) Collected: 01/11/22 1412    Specimen: Blood Updated: 01/11/22 1413     Glucose 75 mg/dL      Comment: Meter: [id number] : [id number] Beatriz STYLES       Ferritin [[id number]]  (Normal) Collected: 01/11/22 0602    Specimen: Blood Updated: 01/11/22 1340     Ferritin 125.00 ng/mL     Narrative:      Results may be falsely decreased if patient taking Biotin.      Iron Profile [[id number]]  (Abnormal) Collected: 01/11/22 0602    Specimen: Blood Updated: 01/11/22 1335     Iron 43 mcg/dL      Iron Saturation 13 %      Transferrin 225 mg/dL      TIBC 335 mcg/dL     Manual Differential " [[id number]]  (Abnormal) Collected: 01/11/22 0602    Specimen: Blood Updated: 01/11/22 0740     Neutrophil % 91.8 %      Lymphocyte % 2.1 %      Monocyte % 5.2 %      Eosinophil % 1.0 %      Neutrophils Absolute 6.98 10*3/mm3      Lymphocytes Absolute 0.16 10*3/mm3      Monocytes Absolute 0.40 10*3/mm3      Eosinophils Absolute 0.08 10*3/mm3      Crenated RBC's Slight/1+     WBC Morphology Normal     Platelet Morphology Normal    CBC & Differential [[id number]]  (Abnormal) Collected: 01/11/22 0602    Specimen: Blood Updated: 01/11/22 0740    Narrative:      The following orders were created for panel order CBC & Differential.  Procedure                               Abnormality         Status                     ---------                               -----------         ------                     CBC Auto Differential[[id number]]        Abnormal            Final result                 Please view results for these tests on the individual orders.    CBC Auto Differential [[id number]]  (Abnormal) Collected: 01/11/22 0602    Specimen: Blood Updated: 01/11/22 0740     WBC 7.60 10*3/mm3      RBC 3.20 10*6/mm3      Hemoglobin 9.8 g/dL      Hematocrit 30.4 %      MCV 95.0 fL      MCH 30.6 pg      MCHC 32.2 g/dL      RDW 13.1 %      RDW-SD 45.2 fl      MPV 10.7 fL      Platelets 233 10*3/mm3     BNP [[id number]]  (Abnormal) Collected: 01/11/22 0602    Specimen: Blood Updated: 01/11/22 0738     proBNP 10,757.0 pg/mL     Narrative:      Among patients with dyspnea, NT-proBNP is highly sensitive for the detection of acute congestive heart failure. In addition NT-proBNP of <300 pg/ml effectively rules out acute congestive heart failure with 99% negative predictive value.    Results may be falsely decreased if patient taking Biotin.      TSH [[id number]]  (Abnormal) Collected: 01/11/22 0602    Specimen: Blood Updated: 01/11/22 0738     TSH 7.370 uIU/mL     Hemoglobin A1c [[id number]]  (Abnormal) Collected: 01/11/22 0602    Specimen:  Blood Updated: 01/11/22 0727     Hemoglobin A1C 6.50 %     Narrative:      Hemoglobin A1C Ranges:    Increased Risk for Diabetes  5.7% to 6.4%  Diabetes                     >= 6.5%  Diabetic Goal                < 7.0%    Comprehensive Metabolic Panel [[id number]]  (Abnormal) Collected: 01/11/22 0602    Specimen: Blood Updated: 01/11/22 0727     Glucose 83 mg/dL      BUN 60 mg/dL      Creatinine 4.62 mg/dL      Sodium 138 mmol/L      Potassium 3.8 mmol/L      Chloride 102 mmol/L      CO2 22.5 mmol/L      Calcium 8.8 mg/dL      Total Protein 6.3 g/dL      Albumin 3.70 g/dL      ALT (SGPT) 20 U/L      AST (SGOT) 26 U/L      Alkaline Phosphatase 101 U/L      Total Bilirubin 0.3 mg/dL      eGFR Non African Amer 13 mL/min/1.73      Comment: <15 Indicative of kidney failure.        eGFR   Amer --     Comment: <15 Indicative of kidney failure.        Globulin 2.6 gm/dL      A/G Ratio 1.4 g/dL      BUN/Creatinine Ratio 13.0     Anion Gap 13.5 mmol/L     Narrative:      GFR Normal >60  Chronic Kidney Disease <60  Kidney Failure <15      Lipid Panel [[id number]]  (Abnormal) Collected: 01/11/22 0602    Specimen: Blood Updated: 01/11/22 0727     Total Cholesterol 156 mg/dL      Triglycerides 175 mg/dL      HDL Cholesterol 35 mg/dL      LDL Cholesterol  91 mg/dL      VLDL Cholesterol 30 mg/dL      LDL/HDL Ratio 2.46    Narrative:      Cholesterol Reference Ranges  (U.S. Department of Health and Human Services ATP III Classifications)    Desirable          <200 mg/dL  Borderline High    200-239 mg/dL  High Risk          >240 mg/dL      Triglyceride Reference Ranges  (U.S. Department of Health and Human Services ATP III Classifications)    Normal           <150 mg/dL  Borderline High  150-199 mg/dL  High             200-499 mg/dL  Very High        >500 mg/dL    HDL Reference Ranges  (U.S. Department of Health and Human Services ATP III Classifcations)    Low     <40 mg/dl (major risk factor for CHD)  High    >60 mg/dl  ('negative' risk factor for CHD)        LDL Reference Ranges  (U.S. Department of Health and Human Services ATP III Classifcations)    Optimal          <100 mg/dL  Near Optimal     100-129 mg/dL  Borderline High  130-159 mg/dL  High             160-189 mg/dL  Very High        >189 mg/dL    POC Glucose Once [[id number]]  (Normal) Collected: 01/11/22 0601    Specimen: Blood Updated: 01/11/22 0603     Glucose 80 mg/dL      Comment: Meter: [id number] : [id number] Daron STYLES       POC Glucose Once [[id number]]  (Abnormal) Collected: 01/10/22 2216    Specimen: Blood Updated: 01/10/22 2217     Glucose 137 mg/dL      Comment: Meter: [id number] : [id number] El Bermudez RN       POC Glucose Once [[id number]]  (Normal) Collected: 01/10/22 1926    Specimen: Blood Updated: 01/10/22 1927     Glucose 94 mg/dL      Comment: Meter: [id number] : [id number] Mike Krishna RN           Imaging Results (Last 24 Hours)     ** No results found for the last 24 hours. **        ECG 12 Lead  Component   Ref Range & Units 1/10/22 0742   QT Interval   ms 505              HEART RATE= 60  bpm  RR Interval= 992  ms  UT Interval= 189  ms  P Horizontal Axis=   deg  P Front Axis= 70  deg  QRSD Interval= 112  ms  QT Interval= 505  ms  QRS Axis= 28  deg  T Wave Axis= -7  deg  - ABNORMAL ECG -  Sinus rhythm  Incomplete left bundle branch block  ST depression, consider ischemia, lateral lds  Prolonged QT interval  Poor quality tracing repeat             Current Facility-Administered Medications:   •  allopurinol (ZYLOPRIM) tablet 100 mg, 100 mg, Oral, BID, Sohail Padron MD, 100 mg at 01/11/22 1421  •  aspirin EC tablet 81 mg, 81 mg, Oral, Daily, Sohail Padron MD, 81 mg at 01/11/22 1420  •  calcitriol (ROCALTROL) capsule 0.25 mcg, 0.25 mcg, Oral, 2 times per day every other day, Shoail Padron MD, 0.25 mcg at 01/11/22 1420  •  cloNIDine (CATAPRES) tablet 0.1 mg, 0.1 mg, Oral, Q12H, Bárbara Gan MD  •  clopidogrel (PLAVIX) tablet 75 mg,  75 mg, Oral, Daily, Sohail Padron MD, 75 mg at 01/11/22 1421  •  famotidine (PEPCID) tablet 20 mg, 20 mg, Oral, Daily, Sohail Padron MD, 20 mg at 01/11/22 1430  •  ferrous sulfate tablet 325 mg, 325 mg, Oral, Daily With Breakfast, Sohail Padron MD, 325 mg at 01/11/22 1421  •  furosemide (LASIX) tablet 80 mg, 80 mg, Oral, BID, Sohail Padron MD, 80 mg at 01/11/22 1421  •  hydrALAZINE (APRESOLINE) tablet 100 mg, 100 mg, Oral, Q8H, Sohail Padron MD, 100 mg at 01/11/22 1436  •  insulin glargine (LANTUS, SEMGLEE) injection 15 Units, 15 Units, Subcutaneous, Nightly, Sohail Padron MD, 15 Units at 01/10/22 2222  •  insulin lispro (ADMELOG) injection 0-7 Units, 0-7 Units, Subcutaneous, TID AC, Sohail Padron MD  •  insulin lispro (ADMELOG) injection 5 Units, 5 Units, Subcutaneous, TID With Meals, Sohail Padron MD  •  levothyroxine (SYNTHROID, LEVOTHROID) tablet 50 mcg, 50 mcg, Oral, Daily, Sohail Padron MD, 50 mcg at 01/11/22 1421  •  metoprolol tartrate (LOPRESSOR) tablet 25 mg, 25 mg, Oral, Q12H, Sohail Padron MD, 25 mg at 01/11/22 1430  •  predniSONE (DELTASONE) tablet 5 mg, 5 mg, Oral, Daily, Sohail Padron MD, 5 mg at 01/11/22 1421  •  [START ON 1/12/2022] rosuvastatin (CRESTOR) tablet 10 mg, 10 mg, Oral, Daily, Bárbara Gan MD  •  sennosides-docusate (PERICOLACE) 8.6-50 MG per tablet 2 tablet, 2 tablet, Oral, Nightly, Dameon Portillo MD, 2 tablet at 01/10/22 2300  •  [COMPLETED] Insert peripheral IV, , , Once **AND** sodium chloride 0.9 % flush 10 mL, 10 mL, Intravenous, PRN, Rodger Pascal MD  •  tacrolimus (PROGRAF) capsule 1 mg, 1 mg, Oral, BID, Sohail Padron MD, 1 mg at 01/11/22 1421    ASSESSMENT  End-stage renal disease on hemodialysis  Status post kidney transplant 2014  Insulin-dependent diabetes mellitus  Hypertension  Hyperlipidemia  Hypothyroidism  Chronic anemia  Gastroesophageal reflux disease    PLAN  CPM  Hemodialysis TIW  Nephrology to follow patient  Continue home medications  Stress  ulcer DVT prophylaxis  Supportive care  Discussed with wife nursing staff and nephrology  Follow closely and further recommendation according to hospital course    OSMANY SOLANO MD

## 2022-01-11 NOTE — PLAN OF CARE
Goal Outcome Evaluation:  Plan of Care Reviewed With: patient        Progress: no change  Outcome Summary: Patient alert and oriented. Patient had a dialysis treatment today. Wife at beside, patient up ad diego. On room air. No s/s of acute distress. Will continue to monitor.

## 2022-01-11 NOTE — PROGRESS NOTES
[facility] Progress Note      Patient Name: Dilshad Cee  : 1955  MRN: [id number]  Primary Care Physician:  Sam Marie MD  Date of admission: 1/10/2022    Subjective     Interval History:   Follow up New ESRD.  On dialysis. Tolerated initiation yesterday.  Goal 3 kg today.  BP coming down as expected.  Eating fine.  Breathing better.     Review of Systems:   As noted above    Objective     Vitals:   Temp:  [97.8 °F (36.6 °C)-98.4 °F (36.9 °C)] 98.4 °F (36.9 °C)  Heart Rate:  [58-72] 63  Resp:  [18] 18  BP: (126-158)/(75-93) 133/75    Intake/Output Summary (Last 24 hours) at 2022 1244  Last data filed at 1/10/2022 1800  Gross per 24 hour   Intake --   Output 3000 ml   Net -3000 ml       Physical Exam:    General Appearance: alert, oriented x 3, no acute distress . On dialysis.   Skin: warm and dry  HEENT: mask in place.  nonicteric sclera  Neck: supple, no JVD  Lungs: Clear to auscultation  Heart: RRR, no s3 or rub  Abdomen: soft, nontender, nondistended.  TK RLQ non tender.  Umbilical hernia.   : no palpable bladder  Extremities: 1+ lower ext edema. RUE AVF patent.   Neuro: normal speech and mental status     Scheduled Meds:     allopurinol, 100 mg, Oral, BID  aspirin, 81 mg, Oral, Daily  calcitriol, 0.25 mcg, Oral, 2 times per day every other day  cloNIDine, 0.1 mg, Oral, Q12H  clopidogrel, 75 mg, Oral, Daily  famotidine, 20 mg, Oral, Daily  ferrous sulfate, 325 mg, Oral, Daily With Breakfast  furosemide, 80 mg, Oral, BID  hydrALAZINE, 100 mg, Oral, Q8H  insulin glargine, 15 Units, Subcutaneous, Nightly  insulin lispro, 0-7 Units, Subcutaneous, TID AC  insulin lispro, 5 Units, Subcutaneous, TID With Meals  levothyroxine, 50 mcg, Oral, Daily  metoprolol tartrate, 25 mg, Oral, Q12H  predniSONE, 5 mg, Oral, Daily  rosuvastatin, 20 mg, Oral, Daily  senna-docusate sodium, 2 tablet, Oral, Nightly  tacrolimus, 1 mg, Oral, BID      IV Meds:        Results  Reviewed:   I have personally reviewed the results from the time of this admission to 1/11/2022 12:44 EST     Results from last 7 days   Lab Units 01/11/22  0602 01/10/22  0733   SODIUM mmol/L 138 140   POTASSIUM mmol/L 3.8 4.2   CHLORIDE mmol/L 102 101   CO2 mmol/L 22.5 20.5*   BUN mg/dL 60* 101*   CREATININE mg/dL 4.62* 6.13*   CALCIUM mg/dL 8.8 9.1   BILIRUBIN mg/dL 0.3 0.3   ALK PHOS U/L 101 110   ALT (SGPT) U/L 20 20   AST (SGOT) U/L 26 25   GLUCOSE mg/dL 83 157*       Estimated Creatinine Clearance: 17.2 mL/min (A) (by C-G formula based on SCr of 4.62 mg/dL (H)).                Results from last 7 days   Lab Units 01/11/22  0602 01/10/22  0733   WBC 10*3/mm3 7.60 7.57   HEMOGLOBIN g/dL 9.8* 10.3*   PLATELETS 10*3/mm3 233 268             Assessment / Plan     ASSESSMENT:  1. New ESRD.  Sp Failed DDKT ( 2014).   SP initiation of dialysis yesterday.  HD today.  Tolerating fine.  Breathing better.  Goal 3 kg today. BP better, so will start reducing meds. Evaluated for PD and interested.  Will see Dr. Portillo as outpt as he will need hernia repair.   2. SP failed DDKT. IGA nephropathy. Still on prednisone and tacrolimus.   3. HTN reduce meds as volume removed.   4. DM2  5. Anemia CKD. Check iron stores and start KATELYNN.   6. PAD.   PLAN:  1. CCP to arrange outpt HD  2. Dietician for education on renal diet.   3 Follow up as outpt with Dr. Portillo regarding PD.     Bárbara Gan MD  01/11/22  12:44 EST    [facility]  [phone]

## 2022-01-12 LAB
ANION GAP SERPL CALCULATED.3IONS-SCNC: 13.7 MMOL/L (ref 5–15)
BASOPHILS # BLD AUTO: 0.01 10*3/MM3 (ref 0–0.2)
BASOPHILS NFR BLD AUTO: 0.2 % (ref 0–1.5)
BUN SERPL-MCNC: 41 MG/DL (ref 8–23)
BUN/CREAT SERPL: 8.6 (ref 7–25)
CALCIUM SPEC-SCNC: 8.9 MG/DL (ref 8.6–10.5)
CHLORIDE SERPL-SCNC: 102 MMOL/L (ref 98–107)
CO2 SERPL-SCNC: 23.3 MMOL/L (ref 22–29)
CREAT SERPL-MCNC: 4.77 MG/DL (ref 0.76–1.27)
DEPRECATED RDW RBC AUTO: 44.8 FL (ref 37–54)
EOSINOPHIL # BLD AUTO: 0.05 10*3/MM3 (ref 0–0.4)
EOSINOPHIL NFR BLD AUTO: 0.9 % (ref 0.3–6.2)
ERYTHROCYTE [DISTWIDTH] IN BLOOD BY AUTOMATED COUNT: 13.1 % (ref 12.3–15.4)
GFR SERPL CREATININE-BSD FRML MDRD: 12 ML/MIN/1.73
GFR SERPL CREATININE-BSD FRML MDRD: ABNORMAL ML/MIN/{1.73_M2}
GLUCOSE BLDC GLUCOMTR-MCNC: 127 MG/DL (ref 70–130)
GLUCOSE BLDC GLUCOMTR-MCNC: 127 MG/DL (ref 70–130)
GLUCOSE BLDC GLUCOMTR-MCNC: 146 MG/DL (ref 70–130)
GLUCOSE BLDC GLUCOMTR-MCNC: 212 MG/DL (ref 70–130)
GLUCOSE SERPL-MCNC: 106 MG/DL (ref 65–99)
HCT VFR BLD AUTO: 30.4 % (ref 37.5–51)
HGB BLD-MCNC: 9.9 G/DL (ref 13–17.7)
IMM GRANULOCYTES # BLD AUTO: 0.02 10*3/MM3 (ref 0–0.05)
IMM GRANULOCYTES NFR BLD AUTO: 0.4 % (ref 0–0.5)
LYMPHOCYTES # BLD AUTO: 0.5 10*3/MM3 (ref 0.7–3.1)
LYMPHOCYTES NFR BLD AUTO: 9.1 % (ref 19.6–45.3)
MCH RBC QN AUTO: 30.7 PG (ref 26.6–33)
MCHC RBC AUTO-ENTMCNC: 32.6 G/DL (ref 31.5–35.7)
MCV RBC AUTO: 94.4 FL (ref 79–97)
MONOCYTES # BLD AUTO: 0.95 10*3/MM3 (ref 0.1–0.9)
MONOCYTES NFR BLD AUTO: 17.4 % (ref 5–12)
NEUTROPHILS NFR BLD AUTO: 3.94 10*3/MM3 (ref 1.7–7)
NEUTROPHILS NFR BLD AUTO: 72 % (ref 42.7–76)
NRBC BLD AUTO-RTO: 0 /100 WBC (ref 0–0.2)
PLATELET # BLD AUTO: 231 10*3/MM3 (ref 140–450)
PMV BLD AUTO: 10.9 FL (ref 6–12)
POTASSIUM SERPL-SCNC: 4.2 MMOL/L (ref 3.5–5.2)
RBC # BLD AUTO: 3.22 10*6/MM3 (ref 4.14–5.8)
SODIUM SERPL-SCNC: 139 MMOL/L (ref 136–145)
WBC NRBC COR # BLD: 5.47 10*3/MM3 (ref 3.4–10.8)

## 2022-01-12 PROCEDURE — 80048 BASIC METABOLIC PNL TOTAL CA: CPT | Performed by: HOSPITALIST

## 2022-01-12 PROCEDURE — 63710000001 PREDNISONE PER 5 MG: Performed by: HOSPITALIST

## 2022-01-12 PROCEDURE — 82962 GLUCOSE BLOOD TEST: CPT

## 2022-01-12 PROCEDURE — 63710000001 TACROLIMUS PER 1 MG: Performed by: HOSPITALIST

## 2022-01-12 PROCEDURE — 85025 COMPLETE CBC W/AUTO DIFF WBC: CPT | Performed by: HOSPITALIST

## 2022-01-12 RX ORDER — ROSUVASTATIN CALCIUM 10 MG/1
10 TABLET, COATED ORAL NIGHTLY
Status: DISCONTINUED | OUTPATIENT
Start: 2022-01-13 | End: 2022-01-13 | Stop reason: HOSPADM

## 2022-01-12 RX ADMIN — ASPIRIN 81 MG: 81 TABLET, COATED ORAL at 08:21

## 2022-01-12 RX ADMIN — ROSUVASTATIN CALCIUM 10 MG: 10 TABLET, FILM COATED ORAL at 08:21

## 2022-01-12 RX ADMIN — PREDNISONE 5 MG: 10 TABLET ORAL at 08:21

## 2022-01-12 RX ADMIN — CLONIDINE HYDROCHLORIDE 0.1 MG: 0.1 TABLET ORAL at 08:21

## 2022-01-12 RX ADMIN — INSULIN GLARGINE-YFGN 15 UNITS: 100 INJECTION, SOLUTION SUBCUTANEOUS at 20:27

## 2022-01-12 RX ADMIN — CLOPIDOGREL 75 MG: 75 TABLET, FILM COATED ORAL at 08:21

## 2022-01-12 RX ADMIN — HYDRALAZINE HYDROCHLORIDE 100 MG: 50 TABLET, FILM COATED ORAL at 06:34

## 2022-01-12 RX ADMIN — FERROUS SULFATE TAB 325 MG (65 MG ELEMENTAL FE) 325 MG: 325 (65 FE) TAB at 08:21

## 2022-01-12 RX ADMIN — FAMOTIDINE 20 MG: 20 TABLET, FILM COATED ORAL at 08:21

## 2022-01-12 RX ADMIN — TACROLIMUS 1 MG: 1 CAPSULE ORAL at 20:17

## 2022-01-12 RX ADMIN — LEVOTHYROXINE SODIUM 50 MCG: 0.05 TABLET ORAL at 06:34

## 2022-01-12 RX ADMIN — FUROSEMIDE 80 MG: 80 TABLET ORAL at 08:21

## 2022-01-12 RX ADMIN — ALLOPURINOL 100 MG: 100 TABLET ORAL at 20:18

## 2022-01-12 RX ADMIN — SODIUM CHLORIDE, PRESERVATIVE FREE 10 ML: 5 INJECTION INTRAVENOUS at 08:22

## 2022-01-12 RX ADMIN — CLONIDINE HYDROCHLORIDE 0.1 MG: 0.1 TABLET ORAL at 20:18

## 2022-01-12 RX ADMIN — FUROSEMIDE 80 MG: 80 TABLET ORAL at 20:17

## 2022-01-12 RX ADMIN — TACROLIMUS 1 MG: 1 CAPSULE ORAL at 08:23

## 2022-01-12 RX ADMIN — ALLOPURINOL 100 MG: 100 TABLET ORAL at 08:21

## 2022-01-12 RX ADMIN — METOPROLOL TARTRATE 25 MG: 25 TABLET, FILM COATED ORAL at 08:21

## 2022-01-12 RX ADMIN — METOPROLOL TARTRATE 25 MG: 25 TABLET, FILM COATED ORAL at 20:18

## 2022-01-12 NOTE — CONSULTS
"Adult Nutrition  Assessment/PES    Patient Name:  Dilshad Cee  YOB: 1955  MRN: [id number]  Admit Date:  1/10/2022    Assessment Date:  1/12/2022    Comments:  Nutrition Consult: Diet Education  Diet education provided on following a renal diet, Low potassium (3gm), Low Phos, and 1500mL fluid restriction to patient and wife.  Discussed foods high in K and Phos to limit and foods that are acceptable.  Handouts provided for patients review. Appropriate questions asked.    Rec- Will changed diet to reflect what was discussed today.    Patient reports he is feeling better after dialysis.  100% PO intake at breakfast today.  RD to continue to follow.       Reason for Assessment     Row Name 01/12/22 1306          Reason for Assessment    Reason For Assessment physician consult     Diagnosis other (see comments)  ESRD. h/o:  Gout, anemia, CKD stage IV,  DM,  sleep apnea, cardiac cath     Identified At Risk by Screening Criteria need for education                Nutrition/Diet History     Row Name 01/12/22 1307          Nutrition/Diet History    Typical Food/Fluid Intake Decreased appetite prior to admission. States he is starting to feel better after dialysis. 100% PO intake.  Provided diet education on following a renal, low K, low Phos diet                Anthropometrics     Row Name 01/12/22 1308       Anthropometrics    Height 175.3 cm (69.02\")       Admit Weight    Admit Weight 86.2 kg (190 lb 0.6 oz)  1/12       Ideal Body Weight (IBW)    Ideal Body Weight (IBW) (kg) 73.73       Body Mass Index (BMI)    BMI Assessment BMI 25-29.9: overweight  BMI: 28.12    Row Name 01/12/22 0830          Anthropometrics    Weight 86.2 kg (190 lb 1.6 oz)                Labs/Tests/Procedures/Meds     Row Name 01/12/22 1308          Labs/Procedures/Meds    Lab Results Reviewed reviewed, pertinent     Lab Results Comments Glucose, BUN, Cr            Diagnostic Tests/Procedures    Diagnostic Test/Procedure Reviewed " "reviewed, pertinent     Diagnostic Test/Procedures Comments dialysis            Medications    Pertinent Medications Reviewed reviewed, pertinent     Pertinent Medications Comments Calcitriol, Ferrous Sulfate, Lasix,  Insulin,  Synthroid, Prednisone, Pericolace                Physical Findings     Row Name 01/12/22 1310          Physical Findings    Overall Physical Appearance overweight                Estimated/Assessed Needs     Row Name 01/12/22 1310 01/12/22 1308       Calculation Measurements    Weight Used For Calculations 86.2 kg (190 lb 0.6 oz) --    Height 175.3 cm (69.02\") 175.3 cm (69.02\")       Estimated/Assessed Needs    Additional Documentation Fluid Requirements (Group); Protein Requirements (Group); KCAL/KG (Group) --       KCAL/KG    KCAL/KG 20 Kcal/Kg (kcal); 18 Kcal/Kg (kcal) --    18 Kcal/Kg (kcal) 1551.6 --    20 Kcal/Kg (kcal) 1724 --       Protein Requirements    Weight Used For Protein Calculations 86.2 kg (190 lb 0.6 oz) --    Est Protein Requirement Amount (gms/kg) 1.2 gm protein --    Estimated Protein Requirements (gms/day) 103.44 --       Fluid Requirements    Fluid Requirements (mL/day) 1551 --    Estimated Fluid Requirement Method RDA Method --    RDA Method (mL) 1551 --               Nutrition Prescription Ordered     Row Name 01/12/22 1311          Nutrition Prescription PO    Current PO Diet Regular     Fluid Consistency Thin                Evaluation of Received Nutrient/Fluid Intake     Row Name 01/12/22 1311          PO Evaluation    % PO Intake 100                     Problem/Interventions:   Problem 1     Row Name 01/12/22 1311          Nutrition Diagnoses Problem 1    Problem 1 Knowledge Deficit     Etiology (related to) Medical Diagnosis     Renal ESRD; Other (comment)  Low K, Low Phos     Signs/Symptoms (evidenced by) Reported  Information Deficit                      Intervention Goal     Row Name 01/12/22 1312          Intervention Goal    General Maintain nutrition; Meet " nutritional needs for age/condition; Reduce/improve symptoms; Disease management/therapy     PO Maintain intake     Weight Appropriate weight loss                Nutrition Intervention     Row Name 01/12/22 1312          Nutrition Intervention    RD/Tech Action Follow Tx progress; Care plan reviewd                Nutrition Prescription     Row Name 01/12/22 1312          Nutrition Prescription PO    PO Prescription Begin/change diet     Begin/Change Diet to Regular     Common Modifiers Fluid Restriction; Renal; Low Potassium     Fluid Restriction mL per Day 1500 mL     Low Potassium Details 3 gm (77 mEq) Potassium     Other Modifiers Low phosphorous     New PO Prescription Ordered? Yes                Education/Evaluation     Row Name 01/12/22 1312          Education    Education Will Instruct as appropriate            Monitor/Evaluation    Monitor Per protocol; PO intake; I&O; Pertinent labs; Symptoms     Education Follow-up Reinforce PRN                 Electronically signed by:  Caterina Holliday RD  01/12/22 13:13 EST

## 2022-01-12 NOTE — PROGRESS NOTES
[facility] Progress Note      Patient Name: Dilshad Cee  : 1955  MRN: [id number]  Primary Care Physician:  Sam Marie MD  Date of admission: 1/10/2022    Subjective     Interval History:   Follow up New ESRD.  Felt good this am.  This afternoon after walking, felt a little chilled.  Eating. Bowels normal. No dysuria.    Not soa.  Talked to dietician.  No fever.   Has outpt spot mwf 4 pm Angela meneses.   Review of Systems:   As noted above    Objective     Vitals:   Temp:  [97.5 °F (36.4 °C)-98.4 °F (36.9 °C)] 97.5 °F (36.4 °C)  Heart Rate:  [53-68] 62  Resp:  [16-17] 16  BP: (108-124)/(64-72) 124/72    Intake/Output Summary (Last 24 hours) at 2022 1442  Last data filed at 2022 1415  Gross per 24 hour   Intake 600 ml   Output --   Net 600 ml       Physical Exam:    General Appearance: alert, oriented x 3, no acute distress .   Skin: warm and dry  HEENT: mask in place.  nonicteric sclera. No oral thrush  Neck: supple, no JVD  Lungs: Clear to auscultation  Heart: RRR, no s3 or rub  Abdomen: soft, nontender, nondistended.  TK RLQ non tender.  Umbilical hernia.   : no palpable bladder  Extremities: 1+ lower ext edema. RUE AVF patent.   Neuro: normal speech and mental status     Scheduled Meds:     allopurinol, 100 mg, Oral, BID  aspirin, 81 mg, Oral, Daily  calcitriol, 0.25 mcg, Oral, 2 times per day every other day  cloNIDine, 0.1 mg, Oral, Q12H  clopidogrel, 75 mg, Oral, Daily  famotidine, 20 mg, Oral, Daily  ferrous sulfate, 325 mg, Oral, Daily With Breakfast  furosemide, 80 mg, Oral, BID  insulin glargine, 15 Units, Subcutaneous, Nightly  insulin lispro, 0-7 Units, Subcutaneous, TID AC  insulin lispro, 5 Units, Subcutaneous, TID With Meals  levothyroxine, 50 mcg, Oral, Daily  metoprolol tartrate, 25 mg, Oral, Q12H  predniSONE, 5 mg, Oral, Daily  rosuvastatin, 10 mg, Oral, Daily  senna-docusate sodium, 2 tablet, Oral, Nightly  tacrolimus, 1 mg,  Oral, BID      IV Meds:        Results Reviewed:   I have personally reviewed the results from the time of this admission to 1/12/2022 14:42 EST     Results from last 7 days   Lab Units 01/12/22  0732 01/11/22  0602 01/10/22  0733   SODIUM mmol/L 139 138 140   POTASSIUM mmol/L 4.2 3.8 4.2   CHLORIDE mmol/L 102 102 101   CO2 mmol/L 23.3 22.5 20.5*   BUN mg/dL 41* 60* 101*   CREATININE mg/dL 4.77* 4.62* 6.13*   CALCIUM mg/dL 8.9 8.8 9.1   BILIRUBIN mg/dL  --  0.3 0.3   ALK PHOS U/L  --  101 110   ALT (SGPT) U/L  --  20 20   AST (SGOT) U/L  --  26 25   GLUCOSE mg/dL 106* 83 157*       Estimated Creatinine Clearance: 16.3 mL/min (A) (by C-G formula based on SCr of 4.77 mg/dL (H)).                Results from last 7 days   Lab Units 01/12/22  0732 01/11/22  0602 01/10/22  0733   WBC 10*3/mm3 5.47 7.60 7.57   HEMOGLOBIN g/dL 9.9* 9.8* 10.3*   PLATELETS 10*3/mm3 231 233 268             Assessment / Plan     ASSESSMENT:  1. New ESRD.  Sp Failed DDKT ( 2014).   SP initiation of dialysis 1/10/22  HD x2. Tolerating fine.  Breathing better. BP better, so will start reducing meds. Evaluated for PD and interested.  Will see Dr. Portillo  Feb 1 at 2:20 pm  as outpt as he will need umbilical hernia repair as well as PD catheter placement .   2. SP failed DDKT. IGA nephropathy. Still on prednisone and tacrolimus. Will taper off as outpt.   3. HTN reduce meds as volume removed.   4. DM2  5. Anemia CKD. Check iron stores and start KATELYNN.   6. PAD.   PLAN:  1. Dialysis here in am.  2. ok for dc tomorrow after HD.  3. Watch temp curve  closely as he is immunosuppressed.  4. Low threshold for blood cultures , UA.    Bárbara Gan MD  01/12/22  14:42 EST    [facility]  [phone]

## 2022-01-12 NOTE — PLAN OF CARE
Goal Outcome Evaluation:   Patient alert, oriented and VSS, stating he feels better after dialysis treatment. Up ad diego to shower with his wife assisting and at bedside. Tolerating medications well, no c/o SOA at his time.

## 2022-01-12 NOTE — CASE MANAGEMENT/SOCIAL WORK
Continued Stay Note  [facility]     Patient Name: Dilshad Cee  MRN: [id number]  Today's Date: 1/12/2022    Admit Date: 1/10/2022     Discharge Plan     Row Name 01/12/22 1431       Plan    Plan Plan home with family and outpatient dialysis at [facility].  ADINA Sneed RN    Patient/Family in Agreement with Plan yes    Plan Comments Received pt's dialysis schedule for out patient dialysis at [facility] at [address] [facility] from [facility] with start date 1/14 @ 4 pm.   Pt provided by Welcome to [facility] letter.   Plan home with outpatient dialysis at [facility].  Plan home with family and outpatient dialysis at [facility].  ADINA Sneed RN    Row Name 01/12/22 1206       Plan    Plan Plan home with family and outpatient dialysis at [facility].   ADINA Sneed RN    Patient/Family in Agreement with Plan yes    Plan Comments Received phone call from Ryley - Clinic Manager at [facility]- regarding anticipated discharge date.  [facility]  Informed Ryley anticipated discharge date is 1/14.   Plan home with family and outpatient dialysis at [facility].   ADINA Sneed RN               Discharge Codes    No documentation.               Expected Discharge Date and Time     Expected Discharge Date Expected Discharge Time    Jan 17, 2022             Karina Sneed RN

## 2022-01-12 NOTE — CASE MANAGEMENT/SOCIAL WORK
Continued Stay Note  [facility]     Patient Name: Dilshad Cee  MRN: [id number]  Today's Date: 1/12/2022    Admit Date: 1/10/2022     Discharge Plan     Row Name 01/12/22 0908       Plan    Plan Plan home with family and outpatient dialysis at [facility].   ADINA Sneed RN    Patient/Family in Agreement with Plan yes    Plan Comments Pt's dialysis flow sheet faxed to [facility].   [facility] should have all the needed information to arrange outpatient dialysis.  Plan home with family and outpatient dialysis at [facility].   ADINA Sneed RN               Discharge Codes    No documentation.               Expected Discharge Date and Time     Expected Discharge Date Expected Discharge Time    Jan 17, 2022             Karina Sneed RN

## 2022-01-12 NOTE — DISCHARGE PLACEMENT REQUEST
"WaterburyDilshad singh (66 y.o. Male)             Date of Birth Social Security Number Address Home Phone MRN    1955  [id number] [place] [address] [phone] [id number]    [facility] Marital Status             Unknown        Admission Date Admission Type Admitting Provider Attending Provider Department, Room/Bed    1/10/22 Emergency Sohail Padron MD Ahmed, Aftab, MD [address], [address]    Discharge Date Discharge Disposition Discharge Destination                         Attending Provider: Sohail Padron MD    Allergies: Cephalexin    Isolation: None   Infection: None   Code Status: CPR   Advance Care Planning Activity    Ht: 175.3 cm (69\")   Wt: 86.2 kg (190 lb 1.6 oz)    Admission Cmt: None   Principal Problem: None                Active Insurance as of 1/10/2022     Primary Coverage     Payor Plan Insurance Group Employer/Plan Group    MEDICARE MEDICARE A & B      Payor Plan Address Payor Plan Phone Number Payor Plan Fax Number Effective Dates    [address] [phone]  12/1/2013 - None Entered    [facility] [id number]       Subscriber Name Subscriber Birth Date Member ID       DILSHAD WRIGHT 1955 [id number]           Secondary Coverage     Payor Plan Insurance Group Employer/Plan Group    [facility] SUPP [id number]     Payor Plan Address Payor Plan Phone Number Payor Plan Fax Number Effective Dates    [address]   1/1/2020 - None Entered    [facility] [id number]       Subscriber Name Subscriber Birth Date Member ID       DILSHAD WRIGHT 1955 [id number]                 Emergency Contacts      (Rel.) Home Phone Work Phone Mobile Phone    Opal Wright (Spouse) [phone] -- --    AstonEvelyn medina (Daughter) [phone] -- --              "

## 2022-01-13 ENCOUNTER — READMISSION MANAGEMENT (OUTPATIENT)
Dept: CALL CENTER | Facility: HOSPITAL | Age: 67
End: 2022-01-13

## 2022-01-13 VITALS
OXYGEN SATURATION: 97 % | HEIGHT: 69 IN | WEIGHT: 191 LBS | TEMPERATURE: 98.2 F | SYSTOLIC BLOOD PRESSURE: 139 MMHG | RESPIRATION RATE: 18 BRPM | DIASTOLIC BLOOD PRESSURE: 89 MMHG | HEART RATE: 62 BPM | BODY MASS INDEX: 28.29 KG/M2

## 2022-01-13 LAB
ALBUMIN SERPL-MCNC: 3.7 G/DL (ref 3.5–5.2)
ANION GAP SERPL CALCULATED.3IONS-SCNC: 14.6 MMOL/L (ref 5–15)
BASOPHILS # BLD AUTO: 0.03 10*3/MM3 (ref 0–0.2)
BASOPHILS NFR BLD AUTO: 0.4 % (ref 0–1.5)
BUN SERPL-MCNC: 54 MG/DL (ref 8–23)
BUN/CREAT SERPL: 8.7 (ref 7–25)
CALCIUM SPEC-SCNC: 8.8 MG/DL (ref 8.6–10.5)
CHLORIDE SERPL-SCNC: 103 MMOL/L (ref 98–107)
CO2 SERPL-SCNC: 21.4 MMOL/L (ref 22–29)
CREAT SERPL-MCNC: 6.23 MG/DL (ref 0.76–1.27)
DEPRECATED RDW RBC AUTO: 47.7 FL (ref 37–54)
EOSINOPHIL # BLD AUTO: 0.12 10*3/MM3 (ref 0–0.4)
EOSINOPHIL NFR BLD AUTO: 1.7 % (ref 0.3–6.2)
ERYTHROCYTE [DISTWIDTH] IN BLOOD BY AUTOMATED COUNT: 13.4 % (ref 12.3–15.4)
GFR SERPL CREATININE-BSD FRML MDRD: 9 ML/MIN/1.73
GFR SERPL CREATININE-BSD FRML MDRD: ABNORMAL ML/MIN/{1.73_M2}
GLUCOSE BLDC GLUCOMTR-MCNC: 74 MG/DL (ref 70–130)
GLUCOSE SERPL-MCNC: 70 MG/DL (ref 65–99)
HCT VFR BLD AUTO: 31.9 % (ref 37.5–51)
HGB BLD-MCNC: 10 G/DL (ref 13–17.7)
IMM GRANULOCYTES # BLD AUTO: 0.04 10*3/MM3 (ref 0–0.05)
IMM GRANULOCYTES NFR BLD AUTO: 0.6 % (ref 0–0.5)
LYMPHOCYTES # BLD AUTO: 1.22 10*3/MM3 (ref 0.7–3.1)
LYMPHOCYTES NFR BLD AUTO: 17.4 % (ref 19.6–45.3)
MCH RBC QN AUTO: 30.7 PG (ref 26.6–33)
MCHC RBC AUTO-ENTMCNC: 31.3 G/DL (ref 31.5–35.7)
MCV RBC AUTO: 97.9 FL (ref 79–97)
MONOCYTES # BLD AUTO: 1.24 10*3/MM3 (ref 0.1–0.9)
MONOCYTES NFR BLD AUTO: 17.7 % (ref 5–12)
NEUTROPHILS NFR BLD AUTO: 4.37 10*3/MM3 (ref 1.7–7)
NEUTROPHILS NFR BLD AUTO: 62.2 % (ref 42.7–76)
NRBC BLD AUTO-RTO: 0 /100 WBC (ref 0–0.2)
PHOSPHATE SERPL-MCNC: 5.4 MG/DL (ref 2.5–4.5)
PLATELET # BLD AUTO: 244 10*3/MM3 (ref 140–450)
PMV BLD AUTO: 10.8 FL (ref 6–12)
POTASSIUM SERPL-SCNC: 4.1 MMOL/L (ref 3.5–5.2)
RBC # BLD AUTO: 3.26 10*6/MM3 (ref 4.14–5.8)
SODIUM SERPL-SCNC: 139 MMOL/L (ref 136–145)
WBC NRBC COR # BLD: 7.02 10*3/MM3 (ref 3.4–10.8)

## 2022-01-13 PROCEDURE — 80069 RENAL FUNCTION PANEL: CPT | Performed by: INTERNAL MEDICINE

## 2022-01-13 PROCEDURE — 85025 COMPLETE CBC W/AUTO DIFF WBC: CPT | Performed by: HOSPITALIST

## 2022-01-13 PROCEDURE — 82962 GLUCOSE BLOOD TEST: CPT

## 2022-01-13 RX ORDER — FAMOTIDINE 20 MG/1
20 TABLET, FILM COATED ORAL DAILY
Qty: 30 TABLET | Refills: 0 | Status: SHIPPED | OUTPATIENT
Start: 2022-01-13 | End: 2022-02-12

## 2022-01-13 RX ORDER — ALBUMIN (HUMAN) 12.5 G/50ML
12.5 SOLUTION INTRAVENOUS AS NEEDED
Status: DISCONTINUED | OUTPATIENT
Start: 2022-01-13 | End: 2022-01-13

## 2022-01-13 RX ADMIN — LEVOTHYROXINE SODIUM 50 MCG: 0.05 TABLET ORAL at 06:35

## 2022-01-13 NOTE — PLAN OF CARE
Goal Outcome Evaluation:  Plan of Care Reviewed With: patient, spouse           Outcome Summary: Pt DC'd home.  Dialysis today 2.5 L off and tolerated well.  [facility] for outpt Dialysis and PD to be placed at a later

## 2022-01-13 NOTE — PROGRESS NOTES
"Daily progress note    Chief complaint  Doing better  No new complaints  Wants to go home  Family bedside  Denies chest pain shortness of breath or palpitation    History of present illness  66-year-old very pleasant white male with history of kidney transplant in 2014 other medical problems hypertension hyperlipidemia hypothyroidism diabetes mellitus osteoarthritis gout chronic anemia presented to [facility] emergency room with shortness of breath with generalized weakness no appetite increased fatigue.  Patient denies any fever chills cough chest pain abdominal pain vomiting diarrhea.  Patient does have some nausea.  Patient work-up in ER revealed     REVIEW OF SYSTEMS  Unremarkable     PHYSICAL EXAM  Blood pressure 139/89, pulse 62, temperature 98.2 °F (36.8 °C), temperature source Oral, resp. rate 18, height 175.3 cm (69.02\"), weight 86.6 kg (191 lb), SpO2 97 %.    GENERAL: Not distressed  HENT: nares patent  EYES: no scleral icterus  CV: regular rhythm, regular rate  RESPIRATORY: normal effort  ABDOMEN: soft nontender nondistended bowel sounds positive  MUSCULOSKELETAL: no deformity  NEURO: alert, moves all extremities, follows commands  SKIN: warm, dry     LAB RESULTS  Lab Results (last 24 hours)     Procedure Component Value Units Date/Time    Renal Function Panel [[id number]]  (Abnormal) Collected: 01/13/22 0559    Specimen: Blood Updated: 01/13/22 0736     Glucose 70 mg/dL      BUN 54 mg/dL      Creatinine 6.23 mg/dL      Sodium 139 mmol/L      Potassium 4.1 mmol/L      Chloride 103 mmol/L      CO2 21.4 mmol/L      Calcium 8.8 mg/dL      Albumin 3.70 g/dL      Phosphorus 5.4 mg/dL      Anion Gap 14.6 mmol/L      BUN/Creatinine Ratio 8.7     eGFR Non African Amer 9 mL/min/1.73      Comment: <15 Indicative of kidney failure.        eGFR   Amer --     Comment: <15 Indicative of kidney failure.       Narrative:      GFR Normal >60  Chronic Kidney Disease <60  Kidney Failure <15      CBC & " Differential [[id number]]  (Abnormal) Collected: 01/13/22 0559    Specimen: Blood Updated: 01/13/22 0637    Narrative:      The following orders were created for panel order CBC & Differential.  Procedure                               Abnormality         Status                     ---------                               -----------         ------                     CBC Auto Differential[[id number]]        Abnormal            Final result                 Please view results for these tests on the individual orders.    CBC Auto Differential [[id number]]  (Abnormal) Collected: 01/13/22 0559    Specimen: Blood Updated: 01/13/22 0637     WBC 7.02 10*3/mm3      RBC 3.26 10*6/mm3      Hemoglobin 10.0 g/dL      Hematocrit 31.9 %      MCV 97.9 fL      MCH 30.7 pg      MCHC 31.3 g/dL      RDW 13.4 %      RDW-SD 47.7 fl      MPV 10.8 fL      Platelets 244 10*3/mm3      Neutrophil % 62.2 %      Lymphocyte % 17.4 %      Monocyte % 17.7 %      Eosinophil % 1.7 %      Basophil % 0.4 %      Immature Grans % 0.6 %      Neutrophils, Absolute 4.37 10*3/mm3      Lymphocytes, Absolute 1.22 10*3/mm3      Monocytes, Absolute 1.24 10*3/mm3      Eosinophils, Absolute 0.12 10*3/mm3      Basophils, Absolute 0.03 10*3/mm3      Immature Grans, Absolute 0.04 10*3/mm3      nRBC 0.0 /100 WBC     POC Glucose Once [[id number]]  (Normal) Collected: 01/13/22 0614    Specimen: Blood Updated: 01/13/22 0615     Glucose 74 mg/dL      Comment: Meter: [id number] : [id number] [employer]" NA       POC Glucose Once [[id number]]  (Abnormal) Collected: 01/12/22 2002    Specimen: Blood Updated: 01/12/22 2003     Glucose 212 mg/dL      Comment: Meter: [id number] : [id number] "[employer] NA       POC Glucose Once [[id number]]  (Abnormal) Collected: 01/12/22 1615    Specimen: Blood Updated: 01/12/22 1617     Glucose 146 mg/dL      Comment: Meter: [id number] : [id number] Beatriz STYLES           Imaging Results (Last 24 Hours)     ** No results  found for the last 24 hours. **        ECG 12 Lead  Component   Ref Range & Units 1/10/22 0742   QT Interval   ms 505              HEART RATE= 60  bpm  RR Interval= 992  ms  LA Interval= 189  ms  P Horizontal Axis=   deg  P Front Axis= 70  deg  QRSD Interval= 112  ms  QT Interval= 505  ms  QRS Axis= 28  deg  T Wave Axis= -7  deg  - ABNORMAL ECG -  Sinus rhythm  Incomplete left bundle branch block  ST depression, consider ischemia, lateral lds  Prolonged QT interval  Poor quality tracing repeat             Current Facility-Administered Medications:   •  albumin human 25 % IV SOLN 12.5 g, 12.5 g, Intravenous, PRN, Tristen Evans MD  •  allopurinol (ZYLOPRIM) tablet 100 mg, 100 mg, Oral, BID, Sohail Padron MD, 100 mg at 01/12/22 2018  •  aspirin EC tablet 81 mg, 81 mg, Oral, Daily, Sohail Padron MD, 81 mg at 01/12/22 0821  •  calcitriol (ROCALTROL) capsule 0.25 mcg, 0.25 mcg, Oral, 2 times per day every other day, Sohail Padron MD, 0.25 mcg at 01/11/22 2239  •  clopidogrel (PLAVIX) tablet 75 mg, 75 mg, Oral, Daily, Sohail Padron MD, 75 mg at 01/12/22 0821  •  famotidine (PEPCID) tablet 20 mg, 20 mg, Oral, Daily, Sohail Padron MD, 20 mg at 01/12/22 0821  •  ferrous sulfate tablet 325 mg, 325 mg, Oral, Daily With Breakfast, Sohail Padron MD, 325 mg at 01/12/22 0821  •  furosemide (LASIX) tablet 80 mg, 80 mg, Oral, BID, Sohail Padron MD, 80 mg at 01/12/22 2017  •  insulin glargine (LANTUS, SEMGLEE) injection 15 Units, 15 Units, Subcutaneous, Nightly, Sohail Padron MD, 15 Units at 01/12/22 2027  •  insulin lispro (ADMELOG) injection 0-7 Units, 0-7 Units, Subcutaneous, TID AC, Sohail Padron MD, 2 Units at 01/11/22 1727  •  insulin lispro (ADMELOG) injection 5 Units, 5 Units, Subcutaneous, TID With Meals, Sohail Padron MD, 5 Units at 01/11/22 1724  •  levothyroxine (SYNTHROID, LEVOTHROID) tablet 50 mcg, 50 mcg, Oral, Daily, Sohail Padron MD, 50 mcg at 01/13/22 0635  •  metoprolol tartrate (LOPRESSOR) tablet 25 mg, 25  mg, Oral, Q12H, Osmany Solnao MD, 25 mg at 01/12/22 2018  •  predniSONE (DELTASONE) tablet 5 mg, 5 mg, Oral, Daily, Osmany Solano MD, 5 mg at 01/12/22 0821  •  rosuvastatin (CRESTOR) tablet 10 mg, 10 mg, Oral, Nightly, Osmany Solano MD  •  sennosides-docusate (PERICOLACE) 8.6-50 MG per tablet 2 tablet, 2 tablet, Oral, Nightly, Dameon Portillo MD, 2 tablet at 01/11/22 2238  •  [COMPLETED] Insert peripheral IV, , , Once **AND** sodium chloride 0.9 % flush 10 mL, 10 mL, Intravenous, PRN, Rodger Pascal MD, 10 mL at 01/12/22 0822  •  tacrolimus (PROGRAF) capsule 1 mg, 1 mg, Oral, BID, Osmany Solano MD, 1 mg at 01/12/22 2017    ASSESSMENT  End-stage renal disease on hemodialysis  Status post kidney transplant 2014  Insulin-dependent diabetes mellitus  Hypertension  Hyperlipidemia  Hypothyroidism  Chronic anemia  Gastroesophageal reflux disease    PLAN  Discharge home  Discharge summary dictated    OSMANY SOLANO MD

## 2022-01-13 NOTE — NURSING NOTE
Dialysis complete, removed 2.5 liters with treatment, no complications noted and vital signs are in epic.

## 2022-01-13 NOTE — CASE MANAGEMENT/SOCIAL WORK
Case Management Discharge Note      Final Note: Pt discharged home with outpatient dialysis arranged at [facility].   ADINA Sneed RN         Selected Continued Care - Discharged on 1/13/2022 Admission date: 1/10/2022 - Discharge disposition: Home or Self Care    Destination    No services have been selected for the patient.              Durable Medical Equipment    No services have been selected for the patient.              Dialysis/Infusion Coordination complete.    Service Provider Selected Services Address Phone Fax Patient Preferred    [facility] [place]  [facility] [address] [facility] [address] [phone] --          Home Medical Care    No services have been selected for the patient.              Therapy    No services have been selected for the patient.              Community Resources    No services have been selected for the patient.              Community & DME    No services have been selected for the patient.                  Transportation Services  Private: Car    Final Discharge Disposition Code: 01 - home or self-care

## 2022-01-13 NOTE — PLAN OF CARE
Goal Outcome Evaluation:  Plan of Care Reviewed With: patient        Progress: improving  Outcome Summary: No complaints or conserns voiced, VSS, NSR on monitor, eyes closed at long interval, resting quietly in room, will continue to monitor

## 2022-01-13 NOTE — DISCHARGE SUMMARY
Discharge summary    Date of admission 1/10/2022  Date of discharge 1/13/2022    Final diagnosis  End-stage renal disease on hemodialysis  Status post kidney transplant 2014  Insulin-dependent diabetes mellitus  Hypertension  Hyperlipidemia  Hypothyroidism  Chronic anemia  Gastroesophageal reflux disease    Discharge medications    Current Facility-Administered Medications:   •  allopurinol (ZYLOPRIM) tablet 100 mg, 100 mg, Oral, BID, Sohail Padron MD, 100 mg at 01/12/22 2018  •  aspirin EC tablet 81 mg, 81 mg, Oral, Daily, Sohail Padron MD, 81 mg at 01/12/22 0821  •  calcitriol (ROCALTROL) capsule 0.25 mcg, 0.25 mcg, Oral, 2 times per day every other day, Sohail Padron MD, 0.25 mcg at 01/11/22 2239  •  clopidogrel (PLAVIX) tablet 75 mg, 75 mg, Oral, Daily, Sohail Padron MD, 75 mg at 01/12/22 0821  •  famotidine (PEPCID) tablet 20 mg, 20 mg, Oral, Daily, Sohail Padron MD, 20 mg at 01/12/22 0821  •  ferrous sulfate tablet 325 mg, 325 mg, Oral, Daily With Breakfast, Sohail Padron MD, 325 mg at 01/12/22 0821  •  furosemide (LASIX) tablet 80 mg, 80 mg, Oral, BID, Sohail Padron MD, 80 mg at 01/12/22 2017  •  insulin glargine (LANTUS, SEMGLEE) injection 15 Units, 15 Units, Subcutaneous, Nightly, Sohail Padron MD, 15 Units at 01/12/22 2027  •  insulin lispro (ADMELOG) injection 0-7 Units, 0-7 Units, Subcutaneous, TID AC, Sohail Padron MD, 2 Units at 01/11/22 1727  •  insulin lispro (ADMELOG) injection 5 Units, 5 Units, Subcutaneous, TID With Meals, Sohail Padron MD, 5 Units at 01/11/22 1724  •  levothyroxine (SYNTHROID, LEVOTHROID) tablet 50 mcg, 50 mcg, Oral, Daily, Sohail Padron MD, 50 mcg at 01/13/22 0635  •  metoprolol tartrate (LOPRESSOR) tablet 25 mg, 25 mg, Oral, Q12H, Sohail Padron MD, 25 mg at 01/12/22 2018  •  predniSONE (DELTASONE) tablet 5 mg, 5 mg, Oral, Daily, Sohail Padron MD, 5 mg at 01/12/22 0821  •  rosuvastatin (CRESTOR) tablet 10 mg, 10 mg, Oral, Nightly, Sohail Padron MD  •  sennosides-docusate  (PERICOLACE) 8.6-50 MG per tablet 2 tablet, 2 tablet, Oral, Nightly, Dameon Portillo MD, 2 tablet at 01/11/22 4098  •  [COMPLETED] Insert peripheral IV, , , Once **AND** sodium chloride 0.9 % flush 10 mL, 10 mL, Intravenous, PRN, Rodger Pascal MD, 10 mL at 01/12/22 0822  •  tacrolimus (PROGRAF) capsule 1 mg, 1 mg, Oral, BID, Osmany Solano MD, 1 mg at 01/12/22 2017     Cultures obtained  Nephrology  General surgery  Nutrition    Procedures  None    Hospital course  66-year-old white male with history of kidney transplant 2002 hypertension hyperlipidemia hypothyroidism diabetes and chronic anemia admitted through emergency room with shortness of breath.  Patient work-up in ER revealed new end-stage renal disease admitted to start dialysis.  Patient wants to go on peritoneal dialysis and general surgery consult obtained and they recommended he needs to be off Plavix for 5 days before they proceed with peritoneal dialysis catheter placement.  Patient already had AV fistula and started on hemodialysis and he has received hemodialysis today and will continue hemodialysis until peritoneal catheter placement and then will switch to peritoneal dialysis.  Patient medication adjusted during this hospitalization.  Patient apparently failed kidney transplant.  Patient cleared from nephrology and general surgery to discharge.    Discharge diet regular    Activity as tolerated    Medication as above    Follow-up with primary doctor in 1 week and follow-up with nephrology and general surgery per the instruction and take medication as directed and keep the appointment for outpatient peritoneal dialysis catheter placement and continue hemodialysis until peritoneal dialysis started.    OSMANY SOLANO MD

## 2022-01-13 NOTE — PROGRESS NOTES
[facility] Progress Note      Patient Name: Dilshad Cee  : 1955  MRN: [id number]  Primary Care Physician:  Sam Marie MD  Date of admission: 1/10/2022    Subjective     Interval History:   Follow up New ESRD.  Feels good this am.  Eating. Bowels normal. No dysuria.  On dialysis.  Using 16 gauge needles.   Has outpt spot mwf 4 pm Angela danius.   Review of Systems:   As noted above    Objective     Vitals:   Temp:  [97.5 °F (36.4 °C)-98.4 °F (36.9 °C)] 98.4 °F (36.9 °C)  Heart Rate:  [60-62] 60  Resp:  [16-18] 18  BP: (111-124)/(67-73) 111/73    Intake/Output Summary (Last 24 hours) at 2022 1051  Last data filed at 2022 1415  Gross per 24 hour   Intake 240 ml   Output --   Net 240 ml       Physical Exam:    General Appearance: alert, oriented x 3, no acute distress . On dialysis.  Qb 350 16 G needles.   systolic .   Skin: warm and dry  HEENT: mask in place.  nonicteric sclera.   Neck: supple, no JVD  Lungs: Clear to auscultation  Heart: RRR, no s3 or rub  Abdomen: soft, nontender, nondistended.  TK RLQ non tender.  Umbilical hernia.   : no palpable bladder  Extremities: 1+ lower ext edema. RUE AVF patent.   Neuro: normal speech and mental status     Scheduled Meds:     allopurinol, 100 mg, Oral, BID  aspirin, 81 mg, Oral, Daily  calcitriol, 0.25 mcg, Oral, 2 times per day every other day  cloNIDine, 0.1 mg, Oral, Q12H  clopidogrel, 75 mg, Oral, Daily  famotidine, 20 mg, Oral, Daily  ferrous sulfate, 325 mg, Oral, Daily With Breakfast  furosemide, 80 mg, Oral, BID  insulin glargine, 15 Units, Subcutaneous, Nightly  insulin lispro, 0-7 Units, Subcutaneous, TID AC  insulin lispro, 5 Units, Subcutaneous, TID With Meals  levothyroxine, 50 mcg, Oral, Daily  metoprolol tartrate, 25 mg, Oral, Q12H  predniSONE, 5 mg, Oral, Daily  rosuvastatin, 10 mg, Oral, Nightly  senna-docusate sodium, 2 tablet, Oral, Nightly  tacrolimus, 1 mg, Oral,  BID      IV Meds:        Results Reviewed:   I have personally reviewed the results from the time of this admission to 1/13/2022 10:51 EST     Results from last 7 days   Lab Units 01/13/22  0559 01/12/22  0732 01/11/22  0602 01/10/22  0733 01/10/22  0733   SODIUM mmol/L 139 139 138   < > 140   POTASSIUM mmol/L 4.1 4.2 3.8   < > 4.2   CHLORIDE mmol/L 103 102 102   < > 101   CO2 mmol/L 21.4* 23.3 22.5   < > 20.5*   BUN mg/dL 54* 41* 60*   < > 101*   CREATININE mg/dL 6.23* 4.77* 4.62*   < > 6.13*   CALCIUM mg/dL 8.8 8.9 8.8   < > 9.1   BILIRUBIN mg/dL  --   --  0.3  --  0.3   ALK PHOS U/L  --   --  101  --  110   ALT (SGPT) U/L  --   --  20  --  20   AST (SGOT) U/L  --   --  26  --  25   GLUCOSE mg/dL 70 106* 83   < > 157*    < > = values in this interval not displayed.       Estimated Creatinine Clearance: 12.5 mL/min (A) (by C-G formula based on SCr of 6.23 mg/dL (H)).    Results from last 7 days   Lab Units 01/13/22  0559   PHOSPHORUS mg/dL 5.4*             Results from last 7 days   Lab Units 01/13/22  0559 01/12/22  0732 01/11/22  0602 01/10/22  0733   WBC 10*3/mm3 7.02 5.47 7.60 7.57   HEMOGLOBIN g/dL 10.0* 9.9* 9.8* 10.3*   PLATELETS 10*3/mm3 244 231 233 268             Assessment / Plan     ASSESSMENT:  1. New ESRD.  Sp Failed DDKT ( 2014).   SP initiation of dialysis 1/10/22  HD x2. Tolerating fine.  Breathing better. BP better, so able to reduce meds.  DC clonidine after tapered dose from TID to bid, now bp systolic 117  So less concern for rebound HTN.   Evaluated for PD and interested.  Will see Dr. Portillo  Feb 1 at 2:20 pm  as outpt as he will need umbilical hernia repair as well as PD catheter placement .   2. SP failed DDKT. IGA nephropathy. Still on prednisone and tacrolimus. Will taper off as outpt. Had also been on zortress ( everolimus) but not continued on admission .  3. HTN reducing meds as volume removed.   4. DM2  5. Anemia CKD. Check iron stores and start KATELYNN.   6. PAD.   PLAN:  1. Dialysis  today, then start outpt HD   2. ok for dc today after dialysis.  3. Do not continue Zortress at home.   4. DC clonidine .  5. Patient will call home training when he has date for PD catheter placement.  Home training is expecting his call.  I spoke to them yesterday .    Bárbara Gan MD  01/13/22  10:51 EST    [facility]  [phone]

## 2022-01-14 NOTE — OUTREACH NOTE
Prep Survey      Responses   [facility] facility patient discharged from? [place]   Is LACE score < 7 ? No   Emergency Room discharge w/ pulse ox? No   Eligibility Readm Mgmt   Discharge diagnosis will continue hemodialysis until peritoneal catheter placement    Does the patient have one of the following disease processes/diagnoses(primary or secondary)? General Surgery   Does the patient have Home health ordered? No   Is there a DME ordered? No   General alerts for this patient outpatient dialysis    Prep survey completed? Yes          Kya Mix RN

## 2022-01-17 ENCOUNTER — READMISSION MANAGEMENT (OUTPATIENT)
Dept: CALL CENTER | Facility: HOSPITAL | Age: 67
End: 2022-01-17

## 2022-01-17 NOTE — OUTREACH NOTE
General Surgery Week 1 Survey      Responses   [facility] patient discharged from? [place]   Does the patient have one of the following disease processes/diagnoses(primary or secondary)? General Surgery   Week 1 attempt successful? No   Unsuccessful attempts Attempt 1          Christine Zapata RN

## 2022-01-19 ENCOUNTER — READMISSION MANAGEMENT (OUTPATIENT)
Dept: CALL CENTER | Facility: HOSPITAL | Age: 67
End: 2022-01-19

## 2022-01-19 NOTE — OUTREACH NOTE
General Surgery Week 1 Survey      Responses   [facility] patient discharged from? [place]   Does the patient have one of the following disease processes/diagnoses(primary or secondary)? General Surgery   Week 1 attempt successful? Yes   Call start time 1600   Call end time 1602   General alerts for this patient outpatient dialysis    Discharge diagnosis will continue hemodialysis until peritoneal catheter placement    Person spoke with today (if not patient) and relationship Evelyn-daughter    Meds reviewed with patient/caregiver? Yes   Is the patient having any side effects they believe may be caused by any medication additions or changes? No   Does the patient have all medications related to this admission filled (includes all antibiotics, pain medications, etc.) Yes   Is the patient taking all medications as directed (includes completed medication regime)? Yes   Does the patient have a follow up appointment scheduled with their surgeon? Yes   Has the patient kept scheduled appointments due by today? N/A   Psychosocial issues? No   Did the patient receive a copy of their discharge instructions? Yes   Nursing interventions Reviewed instructions with patient   What is the patient's perception of their health status since discharge? Improving   Nursing interventions Nurse provided patient education   Is the patient /caregiver able to teach back basic post-op care? Drive as instructed by MD in discharge instructions   Is the patient/caregiver able to teach back signs and symptoms of incisional infection? Fever   Is the patient/caregiver able to teach back steps to recovery at home? Rest and rebuild strength, gradually increase activity   If the patient is a current smoker, are they able to teach back resources for cessation? Not a smoker   Is the patient/caregiver able to teach back the hierarchy of who to call/visit for symptoms/problems? PCP, Specialist, Home health nurse, Urgent Care, ED, 911 Yes   Week 1  call completed? Yes   Revoked No further contact(revokes)-requires comment   Is the patient interested in additional calls from an ambulatory ?  NOTE:  applies to high risk patients requiring additional follow-up. No   Graduated/Revoked comments Pt is doing well and attending dialysis.          Apple Lyon RN

## 2022-02-01 ENCOUNTER — OFFICE VISIT (OUTPATIENT)
Dept: SURGERY | Facility: CLINIC | Age: 67
End: 2022-02-01

## 2022-02-01 VITALS — HEIGHT: 69 IN | BODY MASS INDEX: 27.25 KG/M2 | WEIGHT: 184 LBS

## 2022-02-01 DIAGNOSIS — Z99.2 ESRD ON HEMODIALYSIS: Primary | ICD-10-CM

## 2022-02-01 DIAGNOSIS — N18.6 ESRD ON HEMODIALYSIS: Primary | ICD-10-CM

## 2022-02-01 DIAGNOSIS — K42.9 UMBILICAL HERNIA WITHOUT OBSTRUCTION AND WITHOUT GANGRENE: ICD-10-CM

## 2022-02-01 PROCEDURE — 99214 OFFICE O/P EST MOD 30 MIN: CPT | Performed by: SURGERY

## 2023-03-03 NOTE — PLAN OF CARE
Goal Outcome Evaluation:         Pt. Admitted to this unit at 2140 from ER, Pt. Alert, oriented x4, ambulatory, transferring self, cont. B&B, v/s stable, no voiced c/o pain, 02 sats 95% to 96% on room air, with Fistular to right upper arm with dressing on intact, all evening medicines given as ordered,  no s/s acute distress noted, Hemodialysis scheduled today, will continue to monitor          regular

## 2023-10-04 NOTE — PROGRESS NOTES
"Daily progress note    Chief complaint  Doing same  No new complaints except feeling chilly  Family bedside  Denies chest pain shortness of breath or palpitation    History of present illness  66-year-old very pleasant white male with history of kidney transplant in 2014 other medical problems hypertension hyperlipidemia hypothyroidism diabetes mellitus osteoarthritis gout chronic anemia presented to [facility] emergency room with shortness of breath with generalized weakness no appetite increased fatigue.  Patient denies any fever chills cough chest pain abdominal pain vomiting diarrhea.  Patient does have some nausea.  Patient work-up in ER revealed     REVIEW OF SYSTEMS  Unremarkable except chills and generalized weakness    PHYSICAL EXAM  Blood pressure 124/72, pulse 62, temperature 97.5 °F (36.4 °C), temperature source Oral, resp. rate 16, height 175.3 cm (69.02\"), weight 86.2 kg (190 lb 1.6 oz), SpO2 93 %.    GENERAL: Not distressed  HENT: nares patent  EYES: no scleral icterus  CV: regular rhythm, regular rate  RESPIRATORY: normal effort  ABDOMEN: soft nontender nondistended bowel sounds positive  MUSCULOSKELETAL: no deformity  NEURO: alert, moves all extremities, follows commands  SKIN: warm, dry     LAB RESULTS  Lab Results (last 24 hours)     Procedure Component Value Units Date/Time    POC Glucose Once [[id number]]  (Abnormal) Collected: 01/12/22 1615    Specimen: Blood Updated: 01/12/22 1617     Glucose 146 mg/dL      Comment: Meter: [id number] : [id number] Beatriz Lanza NA       POC Glucose Once [[id number]]  (Normal) Collected: 01/12/22 1042    Specimen: Blood Updated: 01/12/22 1043     Glucose 127 mg/dL      Comment: Meter: [id number] : [id number] AgueroDuran Myla NA       Basic Metabolic Panel [[id number]]  (Abnormal) Collected: 01/12/22 0732    Specimen: Blood Updated: 01/12/22 0850     Glucose 106 mg/dL      BUN 41 mg/dL      Creatinine 4.77 mg/dL      Sodium 139 mmol/L      " Potassium 4.2 mmol/L      Chloride 102 mmol/L      CO2 23.3 mmol/L      Calcium 8.9 mg/dL      eGFR   Amer --     Comment: <15 Indicative of kidney failure.        eGFR Non African Amer 12 mL/min/1.73      Comment: <15 Indicative of kidney failure.        BUN/Creatinine Ratio 8.6     Anion Gap 13.7 mmol/L     Narrative:      GFR Normal >60  Chronic Kidney Disease <60  Kidney Failure <15      CBC & Differential [[id number]]  (Abnormal) Collected: 01/12/22 0732    Specimen: Blood Updated: 01/12/22 0822    Narrative:      The following orders were created for panel order CBC & Differential.  Procedure                               Abnormality         Status                     ---------                               -----------         ------                     CBC Auto Differential[[id number]]        Abnormal            Final result                 Please view results for these tests on the individual orders.    CBC Auto Differential [[id number]]  (Abnormal) Collected: 01/12/22 0732    Specimen: Blood Updated: 01/12/22 0822     WBC 5.47 10*3/mm3      RBC 3.22 10*6/mm3      Hemoglobin 9.9 g/dL      Hematocrit 30.4 %      MCV 94.4 fL      MCH 30.7 pg      MCHC 32.6 g/dL      RDW 13.1 %      RDW-SD 44.8 fl      MPV 10.9 fL      Platelets 231 10*3/mm3      Neutrophil % 72.0 %      Lymphocyte % 9.1 %      Monocyte % 17.4 %      Eosinophil % 0.9 %      Basophil % 0.2 %      Immature Grans % 0.4 %      Neutrophils, Absolute 3.94 10*3/mm3      Lymphocytes, Absolute 0.50 10*3/mm3      Monocytes, Absolute 0.95 10*3/mm3      Eosinophils, Absolute 0.05 10*3/mm3      Basophils, Absolute 0.01 10*3/mm3      Immature Grans, Absolute 0.02 10*3/mm3      nRBC 0.0 /100 WBC     POC Glucose Once [[id number]]  (Normal) Collected: 01/12/22 0624    Specimen: Blood Updated: 01/12/22 0624     Glucose 127 mg/dL      Comment: Meter: [id number] : [id number] Daron STYLES       POC Glucose Once [[id number]]  (Normal) Collected: 01/11/22  2001    Specimen: Blood Updated: 01/11/22 2003     Glucose 106 mg/dL      Comment: Meter: [id number] : [id number] Daron STYLES           Imaging Results (Last 24 Hours)     ** No results found for the last 24 hours. **        ECG 12 Lead  Component   Ref Range & Units 1/10/22 0742   QT Interval   ms 505              HEART RATE= 60  bpm  RR Interval= 992  ms  IN Interval= 189  ms  P Horizontal Axis=   deg  P Front Axis= 70  deg  QRSD Interval= 112  ms  QT Interval= 505  ms  QRS Axis= 28  deg  T Wave Axis= -7  deg  - ABNORMAL ECG -  Sinus rhythm  Incomplete left bundle branch block  ST depression, consider ischemia, lateral lds  Prolonged QT interval  Poor quality tracing repeat             Current Facility-Administered Medications:   •  allopurinol (ZYLOPRIM) tablet 100 mg, 100 mg, Oral, BID, Sohail Padron MD, 100 mg at 01/12/22 0821  •  aspirin EC tablet 81 mg, 81 mg, Oral, Daily, Sohail Padron MD, 81 mg at 01/12/22 0821  •  calcitriol (ROCALTROL) capsule 0.25 mcg, 0.25 mcg, Oral, 2 times per day every other day, Sohail Padron MD, 0.25 mcg at 01/11/22 2239  •  cloNIDine (CATAPRES) tablet 0.1 mg, 0.1 mg, Oral, Q12H, Bárbara Gan MD, 0.1 mg at 01/12/22 0821  •  clopidogrel (PLAVIX) tablet 75 mg, 75 mg, Oral, Daily, Sohail Padron MD, 75 mg at 01/12/22 0821  •  famotidine (PEPCID) tablet 20 mg, 20 mg, Oral, Daily, Sohail Padron MD, 20 mg at 01/12/22 0821  •  ferrous sulfate tablet 325 mg, 325 mg, Oral, Daily With Breakfast, Sohail Padron MD, 325 mg at 01/12/22 0821  •  furosemide (LASIX) tablet 80 mg, 80 mg, Oral, BID, Sohail Padron MD, 80 mg at 01/12/22 0821  •  insulin glargine (LANTUS, SEMGLEE) injection 15 Units, 15 Units, Subcutaneous, Nightly, Sohail Padron MD, 15 Units at 01/11/22 2242  •  insulin lispro (ADMELOG) injection 0-7 Units, 0-7 Units, Subcutaneous, TID Vito Aftab, MD, 2 Units at 01/11/22 1727  •  insulin lispro (ADMELOG) injection 5 Units, 5 Units, Subcutaneous, TID With Meals,  Osmany Padron MD, 5 Units at 01/11/22 1724  •  levothyroxine (SYNTHROID, LEVOTHROID) tablet 50 mcg, 50 mcg, Oral, Daily, Osmany Padron MD, 50 mcg at 01/12/22 0634  •  metoprolol tartrate (LOPRESSOR) tablet 25 mg, 25 mg, Oral, Q12H, Osmany Padron MD, 25 mg at 01/12/22 0821  •  predniSONE (DELTASONE) tablet 5 mg, 5 mg, Oral, Daily, Osmany Padron MD, 5 mg at 01/12/22 0821  •  rosuvastatin (CRESTOR) tablet 10 mg, 10 mg, Oral, Daily, Bárbara Gan MD, 10 mg at 01/12/22 0821  •  sennosides-docusate (PERICOLACE) 8.6-50 MG per tablet 2 tablet, 2 tablet, Oral, Nightly, Dameon Portillo MD, 2 tablet at 01/11/22 2238  •  [COMPLETED] Insert peripheral IV, , , Once **AND** sodium chloride 0.9 % flush 10 mL, 10 mL, Intravenous, PRN, Rodger Pascal MD, 10 mL at 01/12/22 0822  •  tacrolimus (PROGRAF) capsule 1 mg, 1 mg, Oral, BID, Osmany Padron MD, 1 mg at 01/12/22 0823    ASSESSMENT  End-stage renal disease on hemodialysis  Status post kidney transplant 2014  Insulin-dependent diabetes mellitus  Hypertension  Hyperlipidemia  Hypothyroidism  Chronic anemia  Gastroesophageal reflux disease    PLAN  CPM  Repeat hemodialysis in a.m.  Nephrology to follow patient  Continue home medications  Stress ulcer DVT prophylaxis  Supportive care  Discussed with wife nursing staff and nephrology  Follow closely and further recommendation according to hospital course    OSMANY PADRON MD     Show Aperture Variable?: Yes Render Post-Care Instructions In Note?: no Medical Necessity Clause: This procedure was medically necessary because the lesions that were treated were: Post-Care Instructions: I reviewed with the patient in detail post-care instructions. Patient is to wear sunprotection, and avoid picking at any of the treated lesions. Pt may apply Vaseline to crusted or scabbing areas. Detail Level: Detailed Consent: The patient's consent was obtained including but not limited to risks of crusting, scabbing, blistering, scarring, darker or lighter pigmentary change, recurrence, incomplete removal and infection. Medical Necessity Information: It is in your best interest to select a reason for this procedure from the list below. All of these items fulfill various CMS LCD requirements except the new and changing color options. Spray Paint Text: The liquid nitrogen was applied to the skin utilizing a spray paint frosting technique. Application Tool (Optional): Liquid Nitrogen Sprayer Duration Of Freeze Thaw-Cycle (Seconds): 2 Number Of Freeze-Thaw Cycles: 1 freeze-thaw cycle verbal cues/1 person assist

## (undated) DEVICE — PINNACLE INTRODUCER SHEATH: Brand: PINNACLE

## (undated) DEVICE — CATH DIAG IMPULSE MPA2 SH 5F 100CM

## (undated) DEVICE — INTRO SHEATH ART/FEM ENGAGE .035 6F12CM

## (undated) DEVICE — CATH DIAG IMPULSE FL4 6F 100CM

## (undated) DEVICE — CATH DIAG IMPULSE FR4 6F 100CM

## (undated) DEVICE — Device

## (undated) DEVICE — 6F .070 IM 90CM: Brand: VISTA BRITE TIP

## (undated) DEVICE — DEV INDEFLATOR P/N 580289

## (undated) DEVICE — HI-TORQUE WHISPER ES GUIDE WIRE .014 STRAIGHTTIP 3.0 CM X 190 CM: Brand: HI-TORQUE WHISPER

## (undated) DEVICE — PK CATH CARD 40

## (undated) DEVICE — KT MANIFLD CARDIAC

## (undated) DEVICE — GUIDE CATHETER: Brand: MACH1™

## (undated) DEVICE — GW INQWIRE FC PTFE J/3MM .035 180

## (undated) DEVICE — ANGIO-SEAL VIP VASCULAR CLOSURE DEVICE: Brand: ANGIO-SEAL